# Patient Record
Sex: FEMALE | Race: BLACK OR AFRICAN AMERICAN | NOT HISPANIC OR LATINO | Employment: FULL TIME | URBAN - METROPOLITAN AREA
[De-identification: names, ages, dates, MRNs, and addresses within clinical notes are randomized per-mention and may not be internally consistent; named-entity substitution may affect disease eponyms.]

---

## 2018-01-02 ENCOUNTER — OFFICE VISIT (OUTPATIENT)
Dept: URGENT CARE | Facility: CLINIC | Age: 48
End: 2018-01-02
Payer: COMMERCIAL

## 2018-01-02 VITALS
SYSTOLIC BLOOD PRESSURE: 127 MMHG | HEART RATE: 112 BPM | TEMPERATURE: 101 F | HEIGHT: 64 IN | DIASTOLIC BLOOD PRESSURE: 87 MMHG | BODY MASS INDEX: 30.73 KG/M2 | OXYGEN SATURATION: 97 % | WEIGHT: 180 LBS

## 2018-01-02 DIAGNOSIS — R50.9 FEVER, UNSPECIFIED FEVER CAUSE: ICD-10-CM

## 2018-01-02 DIAGNOSIS — R11.0 NAUSEA IN ADULT: ICD-10-CM

## 2018-01-02 DIAGNOSIS — B34.9 VIRAL SYNDROME: Primary | ICD-10-CM

## 2018-01-02 DIAGNOSIS — R10.9 ABDOMINAL PAIN, UNSPECIFIED ABDOMINAL LOCATION: ICD-10-CM

## 2018-01-02 LAB
B-HCG UR QL: NEGATIVE
BILIRUB UR QL STRIP: NEGATIVE
CTP QC/QA: YES
CTP QC/QA: YES
FLUAV AG NPH QL: NEGATIVE
FLUBV AG NPH QL: NEGATIVE
GLUCOSE UR QL STRIP: NEGATIVE
KETONES UR QL STRIP: POSITIVE
LEUKOCYTE ESTERASE UR QL STRIP: NEGATIVE
PH, POC UA: 5.5 (ref 5–8)
POC BLOOD, URINE: NEGATIVE
POC NITRATES, URINE: NEGATIVE
PROT UR QL STRIP: NEGATIVE
SP GR UR STRIP: 1.02 (ref 1–1.03)
UROBILINOGEN UR STRIP-ACNC: NORMAL (ref 0.1–1.1)

## 2018-01-02 PROCEDURE — 99213 OFFICE O/P EST LOW 20 MIN: CPT | Mod: 25,S$GLB,, | Performed by: FAMILY MEDICINE

## 2018-01-02 PROCEDURE — 81025 URINE PREGNANCY TEST: CPT | Mod: S$GLB,,, | Performed by: FAMILY MEDICINE

## 2018-01-02 PROCEDURE — 81003 URINALYSIS AUTO W/O SCOPE: CPT | Mod: QW,S$GLB,, | Performed by: FAMILY MEDICINE

## 2018-01-02 PROCEDURE — 87804 INFLUENZA ASSAY W/OPTIC: CPT | Mod: 59,QW,S$GLB, | Performed by: FAMILY MEDICINE

## 2018-01-02 NOTE — PATIENT INSTRUCTIONS
"  Viral Syndrome (Adult)  A viral illness may cause a number of symptoms. The symptoms depend on the part of the body that the virus affects. If it settles in your nose, throat, and lungs, it may cause cough, sore throat, congestion, and sometimes headache. If it settles in your stomach and intestinal tract, it may cause vomiting and diarrhea. Sometimes it causes vague symptoms like "aching all over," feeling tired, loss of appetite, or fever.  A viral illness usually lasts 1 to 2 weeks, but sometimes it lasts longer. In some cases, a more serious infection can look like a viral syndrome in the first few days of the illness. You may need another exam and additional tests to know the difference. Watch for the warning signs listed below.  Home care  Follow these guidelines for taking care of yourself at home:  · If symptoms are severe, rest at home for the first 2 to 3 days.  · Stay away from cigarette smoke - both your smoke and the smoke from others.  · You may use over-the-counter acetaminophen or ibuprofen for fever, muscle aching, and headache, unless another medicine was prescribed for this. If you have chronic liver or kidney disease or ever had a stomach ulcer or GI bleeding, talk with your doctor before using these medicines. No one who is younger than 18 and ill with a fever should take aspirin. It may cause severe disease or death.  · Your appetite may be poor, so a light diet is fine. Avoid dehydration by drinking 8 to 12 8-ounce glasses of fluids each day. This may include water; orange juice; lemonade; apple, grape, and cranberry juice; clear fruit drinks; electrolyte replacement and sports drinks; and decaffeinated teas and coffee. If you have been diagnosed with a kidney disease, ask your doctor how much and what types of fluids you should drink to prevent dehydration. If you have kidney disease, drinking too much fluid can cause it build up in the your body and be dangerous to your " health.  · Over-the-counter remedies won't shorten the length of the illness but may be helpful for cough, sore throat; and nasal and sinus congestion. Don't use decongestants if you have high blood pressure.  Follow-up care  Follow up with your healthcare provider if you do not improve over the next week.  Call 911  Get emergency medical care if any of the following occur:  · Convulsion  · Feeling weak, dizzy, or like you are going to faint  · Chest pain, shortness of breath, wheezing, or difficulty breathing  When to seek medical advice  Call your healthcare provider right away if any of these occur:  · Cough with lots of colored sputum (mucus) or blood in your sputum  · Chest pain, shortness of breath, wheezing, or difficulty breathing  · Severe headache; face, neck, or ear pain  · Severe, constant pain in the lower right side of your belly (abdominal)  · Continued vomiting (cant keep liquids down)  · Frequent diarrhea (more than 5 times a day); blood (red or black color) or mucus in diarrhea  · Feeling weak, dizzy, or like you are going to faint  · Extreme thirst  · Fever of 100.4°F (38°C) or higher, or as directed by your healthcare provider  Date Last Reviewed: 9/25/2015  © 1124-0993 [employer]. [address] [place] [address], [place] [address]. All rights reserved. This information is not intended as a substitute for professional medical care. Always follow your healthcare professional's instructions.      Try OTC Prilosec (generic Omeprazole) for abdominal discomfort.  Tylenol as needed for elevated temperature.  Off work today and tomorrow.  Recheck if not continuing to improve.

## 2018-01-02 NOTE — PROGRESS NOTES
"Subjective:       Patient ID: Meenakshi Lee is a 47 y.o. female.    Vitals:  height is 5' 4" (1.626 m) and weight is 81.6 kg (180 lb). Her oral temperature is 100.5 °F (38.1 °C) (abnormal). Her blood pressure is 127/87 and her pulse is 112 (abnormal). Her oxygen saturation is 97%.     Chief Complaint: Fever and Headache    48 yo female 3 day history of low grade temp, aches and epigastic discomfort that started last night. Denies dysuria, but now with beginnings of back discomfort which occurred in past with UTI.  Denies URI symptoms.  No regular meds.  No OTC Advil or Ibuprofen. Reports regular and daily BMs except today. No vomiting, but complaints of nausea. S/P endometrial ablation and infrequent menses. She reports symptoms are actually improving at this point, a little better that yesterday.      Fever    This is a new problem. The current episode started in the past 7 days. The problem occurs constantly. The problem has been unchanged. The maximum temperature noted was 99 to 99.9 F. The temperature was taken using an oral thermometer. Associated symptoms include abdominal pain, headaches and nausea. Pertinent negatives include no chest pain, diarrhea, urinary pain or vomiting.   Headache    This is a new problem. The current episode started in the past 7 days. The problem occurs constantly. The problem has been unchanged. Associated symptoms include abdominal pain, a fever and nausea. Pertinent negatives include no back pain or vomiting.     Review of Systems   Constitution: Positive for chills and fever.   HENT: Negative.    Cardiovascular: Negative for chest pain.   Respiratory: Negative for shortness of breath.    Musculoskeletal: Negative for back pain.   Gastrointestinal: Positive for abdominal pain and nausea. Negative for constipation, diarrhea, hematochezia, melena and vomiting.   Genitourinary: Negative for dysuria.   Neurological: Positive for headaches.       Objective:      Physical Exam "   Constitutional: She is oriented to person, place, and time. She appears well-developed and well-nourished.   HENT:   Head: Normocephalic and atraumatic.   Right Ear: External ear normal.   Left Ear: External ear normal.   Mouth/Throat: Oropharynx is clear and moist.   Eyes: EOM are normal. Pupils are equal, round, and reactive to light.   Neck: Normal range of motion.   Cardiovascular: Normal rate and regular rhythm.    Pulmonary/Chest: Effort normal and breath sounds normal. No respiratory distress. She has no wheezes. She has no rales.   Abdominal: Soft. Bowel sounds are normal. She exhibits no distension and no mass. There is no tenderness. There is no rebound and no guarding.   Very minimal epigastric discomfort only.  No abdominal pain elsewhere.No G/R/R.  No CVA pain.  Normal bowel sounds,   Neurological: She is alert and oriented to person, place, and time.   Psychiatric: She has a normal mood and affect. Her behavior is normal. Judgment and thought content normal.       Assessment:       1. Viral syndrome    2. Fever, unspecified fever cause    3. Abdominal pain, unspecified abdominal location    4. Nausea in adult        Plan:         Viral syndrome    Fever, unspecified fever cause  -     POCT Influenza A/B    Abdominal pain, unspecified abdominal location  -     POCT Urinalysis, Dipstick, Automated, W/O Scope    Nausea in adult  -     POCT urine pregnancy    Influenza Negative. UPT Negative.  Urinalysis Neg, except for small ketones.

## 2018-01-02 NOTE — LETTER
January 2, 2018      [facility] [address], [address]  [place] [address]  Phone: [phone]  Fax: [phone]       Patient: Meenakshi Lee   YOB: 1970  Date of Visit: 01/02/2018    To Whom It May Concern:    Denisha Lee  was at [facility] on 01/02/2018. She may return to work/school on Jan4, 2018 with no restrictions. If you have any questions or concerns, or if I can be of further assistance, please do not hesitate to contact me.    Sincerely,          Priyanka Darby MD

## 2018-12-20 ENCOUNTER — OFFICE VISIT (OUTPATIENT)
Dept: URGENT CARE | Facility: CLINIC | Age: 48
End: 2018-12-20
Payer: COMMERCIAL

## 2018-12-20 VITALS
HEART RATE: 83 BPM | TEMPERATURE: 98 F | WEIGHT: 187 LBS | HEIGHT: 64 IN | BODY MASS INDEX: 31.92 KG/M2 | DIASTOLIC BLOOD PRESSURE: 90 MMHG | SYSTOLIC BLOOD PRESSURE: 125 MMHG | OXYGEN SATURATION: 97 % | RESPIRATION RATE: 18 BRPM

## 2018-12-20 DIAGNOSIS — M54.6 ACUTE RIGHT-SIDED THORACIC BACK PAIN: ICD-10-CM

## 2018-12-20 DIAGNOSIS — M54.2 NECK PAIN ON RIGHT SIDE: Primary | ICD-10-CM

## 2018-12-20 PROCEDURE — 96372 THER/PROPH/DIAG INJ SC/IM: CPT | Mod: S$GLB,,, | Performed by: FAMILY MEDICINE

## 2018-12-20 RX ORDER — NAPROXEN 500 MG/1
500 TABLET ORAL 2 TIMES DAILY WITH MEALS
Qty: 20 TABLET | Refills: 0 | Status: SHIPPED | OUTPATIENT
Start: 2018-12-20 | End: 2019-02-02

## 2018-12-20 RX ORDER — CYCLOBENZAPRINE HCL 5 MG
5 TABLET ORAL 3 TIMES DAILY PRN
Qty: 30 TABLET | Refills: 0 | Status: SHIPPED | OUTPATIENT
Start: 2018-12-20 | End: 2018-12-30

## 2018-12-20 RX ORDER — METHYLPREDNISOLONE 4 MG/1
TABLET ORAL
Qty: 21 TABLET | Refills: 0 | Status: SHIPPED | OUTPATIENT
Start: 2018-12-20 | End: 2019-02-02

## 2018-12-20 RX ORDER — KETOROLAC TROMETHAMINE 30 MG/ML
30 INJECTION, SOLUTION INTRAMUSCULAR; INTRAVENOUS
Status: COMPLETED | OUTPATIENT
Start: 2018-12-20 | End: 2018-12-20

## 2018-12-20 RX ORDER — BETAMETHASONE SODIUM PHOSPHATE AND BETAMETHASONE ACETATE 3; 3 MG/ML; MG/ML
6 INJECTION, SUSPENSION INTRA-ARTICULAR; INTRALESIONAL; INTRAMUSCULAR; SOFT TISSUE
Status: COMPLETED | OUTPATIENT
Start: 2018-12-20 | End: 2018-12-20

## 2018-12-20 RX ADMIN — BETAMETHASONE SODIUM PHOSPHATE AND BETAMETHASONE ACETATE 6 MG: 3; 3 INJECTION, SUSPENSION INTRA-ARTICULAR; INTRALESIONAL; INTRAMUSCULAR; SOFT TISSUE at 11:12

## 2018-12-20 RX ADMIN — KETOROLAC TROMETHAMINE 30 MG: 30 INJECTION, SOLUTION INTRAMUSCULAR; INTRAVENOUS at 11:12

## 2018-12-20 NOTE — PATIENT INSTRUCTIONS
DO NOT START STEROID DOSE PACK OR NAPROSYN UNTIL TOMORROW    CAN TAKE FLEXERIL AT NIGHT    BE SURE TO STRETCH; STILLNESS PROMOTES SPASMS     ALTERNATE ICE AND HEAT    Back Care Tips    Caring for your back  These are things you can do to prevent a recurrence of acute back pain and to reduce symptoms from chronic back pain:  · Maintain a healthy weight. If you are overweight, losing weight will help most types of back pain.  · Exercise is an important part of recovery from most types of back pain. The muscles behind and in front of the spine support the back. This means strengthening both the back muscles and the abdominal muscles will provide better support for your spine.   · Swimming and brisk walking are good overall exercises to improve your fitness level.  · Practice safe lifting methods (below).  · Practice good posture when sitting, standing and walking. Avoid prolonged sitting. This puts more stress on the lower back than standing or walking.  · Wear quality shoes with sufficient arch support. Foot and ankle alignment can affect back symptoms. Women should avoid wearing high heels.  · Therapeutic massage can help relax the back muscles without stretching them.  · During the first 24 to 72 hours after an acute injury or flare-up of chronic back pain, apply an ice pack to the painful area for 20 minutes and then remove it for 20 minutes, over a period of 60 to 90 minutes, or several times a day. As a safety precaution, do not use a heating pad at bedtime. Sleeping on a heating pad can lead to skin burns or tissue damage.  · You can alternate ice and heat therapies.  Medications  Talk to your healthcare provider before using medicines, especially if you have other medical problems or are taking other medicines.  · You may use acetaminophen or ibuprofen to control pain, unless your healthcare provider prescribed other pain medicine. If you have chronic conditions like diabetes, liver or kidney disease,  stomach ulcers, or gastrointestinal bleeding, or are taking blood thinners, talk with your healthcare provider before taking any medicines.  · Be careful if you are given prescription pain medicines, narcotics, or medicine for muscle spasm. They can cause drowsiness, affect your coordination, reflexes, and judgment. Do not drive or operate heavy machinery while taking these types of medicines. Take prescription pain medicine only as prescribed by your healthcare provider.  Lumbar stretch  Here is a simple stretching exercise that will help relax muscle spasm and keep your back more limber. If exercise makes your back pain worse, dont do it.  · Lie on your back with your knees bent and both feet on the ground.  · Slowly raise your left knee to your chest as you flatten your lower back against the floor. Hold for 5 seconds.  · Relax and repeat the exercise with your right knee.  · Do 10 of these exercises for each leg.  Safe lifting method  · Dont bend over at the waist to lift an object off the floor.  Instead, bend your knees and hips in a squat.   · Keep your back and head upright  · Hold the object close to your body, directly in front of you.  · Straighten your legs to lift the object.   · Lower the object to the floor in the reverse fashion.  · If you must slide something across the floor, push it.  Posture tips  Sitting  Sit in chairs with straight backs or low-back support. Keep your knees lower than your hips, with your feet flat on the floor.  When driving, sit up straight. Adjust the seat forward so you are not leaning toward the steering wheel.  A small pillow or rolled towel behind your lower back may help if you are driving long distances.   Standing  When standing for long periods, shift most of your weight to one leg at a time. Alternate legs every few minutes.   Sleeping  The best way to sleep is on your side with your knees bent. Put a low pillow under your head to support your neck in a neutral  spine position. Avoid thick pillows that bend your neck to one side. Put a pillow between your legs to further relax your lower back. If you sleep on your back, put pillows under your knees to support your legs in a slightly flexed position. Use a firm mattress. If your mattress sags, replace it, or use a 1/2-inch plywood board under the mattress to add support.  Follow-up care  Follow up with your healthcare provider, or as advised.  If X-rays, a CT scan or an MRI scan were taken, they will be reviewed by a radiologist. You will be notified of any new findings that may affect your care.  Call 911  Seek emergency medical care if any of the following occur:  · Trouble breathing  · Confusion  · Very drowsy  · Fainting or loss of consciousness  · Rapid or very slow heart rate  · Loss of  bowel or bladder control  When to seek medical care  Call your healthcare provider if any of the following occur:  · Pain becomes worse or spreads to your arms or legs  · Weakness or numbness in one or both arms or legs  · Numbness in the groin area  Date Last Reviewed: 6/1/2016  © 7196-8398 [employer]. [address] [place] [address], [place] [address]. All rights reserved. This information is not intended as a substitute for professional medical care. Always follow your healthcare professional's instructions.

## 2018-12-20 NOTE — LETTER
December 20, 2018      [facility] [address], [address]  [place] [address]  Phone: [phone]  Fax: [phone]       Patient: Meenakshi Lee   YOB: 1970  Date of Visit: 12/20/2018    To Whom It May Concern:    Denisha Lee  was at [facility] on 12/20/2018. She may return to work/school on 12/21/2018 with no restrictions. If you have any questions or concerns, or if I can be of further assistance, please do not hesitate to contact me.    Sincerely,    Prosper Weaver, NP

## 2018-12-20 NOTE — PROGRESS NOTES
"Subjective:       Patient ID: Meenakshi Lee is a 48 y.o. female.    Vitals:  height is 5' 4" (1.626 m) and weight is 84.8 kg (187 lb). Her temperature is 97.6 °F (36.4 °C). Her blood pressure is 125/90 (abnormal) and her pulse is 83. Her respiration is 18 and oxygen saturation is 97%.     Chief Complaint: Back Pain and Neck Pain    Pt states Sunday night she bagan to have neck/upper back pain on right side and has been taken flexeril without relief. In 2015, pt was diagnosed with bulging disc in neck. Pt denies any trauma/injury.      Back Pain   This is a new problem. The current episode started in the past 7 days. The problem occurs constantly. The problem has been gradually worsening since onset. The pain is present in the thoracic spine. The quality of the pain is described as aching. The pain does not radiate. The pain is at a severity of 8/10. The pain is moderate. The pain is worse during the night. The symptoms are aggravated by lying down, sitting, stress, twisting and standing. Stiffness is present all day. Pertinent negatives include no abdominal pain, bladder incontinence, bowel incontinence, dysuria or numbness. Treatments tried: flexeril Ibuprofen 800 tylenol. The treatment provided no relief.   Neck Pain    This is a new problem. The current episode started in the past 7 days. The problem occurs constantly. The problem has been gradually worsening. The pain is associated with an unknown factor. The pain is present in the right side. The quality of the pain is described as aching and stabbing. The pain is at a severity of 8/10. The pain is moderate. The symptoms are aggravated by bending, coughing, position and stress. The pain is worse during the night. Stiffness is present all day. Pertinent negatives include no numbness. Treatments tried: flexeril ibprufen tylenol. The treatment provided no relief.       Constitution: Negative for fatigue.   Neck: Positive for neck pain, neck stiffness and bulging " disc disease.   Gastrointestinal: Negative for abdominal pain and bowel incontinence.   Genitourinary: Negative for dysuria, urgency, bladder incontinence and hematuria.   Musculoskeletal: Positive for back pain and muscle ache. Negative for muscle cramps and history of spine disorder.        Neck pain     Skin: Negative for rash.   Neurological: Negative for coordination disturbances, numbness and tingling.       Objective:      Physical Exam   Constitutional: She is oriented to person, place, and time. Vital signs are normal. She appears well-developed and well-nourished. She is active and cooperative. No distress.   HENT:   Head: Normocephalic and atraumatic.   Nose: Nose normal.   Mouth/Throat: Oropharynx is clear and moist and mucous membranes are normal.   Eyes: Conjunctivae and lids are normal.   Neck: Trachea normal, normal range of motion, full passive range of motion without pain and phonation normal. Neck supple.   Cardiovascular: Normal rate, regular rhythm, normal heart sounds, intact distal pulses and normal pulses.   Pulmonary/Chest: Effort normal and breath sounds normal.   Abdominal: Soft. Normal appearance and bowel sounds are normal. She exhibits no abdominal bruit, no pulsatile midline mass and no mass.   Musculoskeletal: She exhibits no edema or deformity.        Cervical back: She exhibits decreased range of motion, tenderness, pain and spasm.        Thoracic back: She exhibits tenderness, pain and spasm.        Back:    Neurological: She is alert and oriented to person, place, and time. She has normal strength and normal reflexes. No sensory deficit.   Skin: Skin is warm, dry and intact. She is not diaphoretic.   Psychiatric: She has a normal mood and affect. Her speech is normal and behavior is normal. Judgment and thought content normal. Cognition and memory are normal.   Nursing note and vitals reviewed.      Assessment:       1. Neck pain on right side    2. Acute right-sided thoracic back  pain        Plan:         Neck pain on right side  -     ketorolac injection 30 mg  -     betamethasone acetate-betamethasone sodium phosphate injection 6 mg  -     methylPREDNISolone (MEDROL DOSEPACK) 4 mg tablet; Take as directed on package  Dispense: 21 tablet; Refill: 0  -     cyclobenzaprine (FLEXERIL) 5 MG tablet; Take 1 tablet (5 mg total) by mouth 3 (three) times daily as needed.  Dispense: 30 tablet; Refill: 0  -     naproxen (NAPROSYN) 500 MG tablet; Take 1 tablet (500 mg total) by mouth 2 (two) times daily with meals.  Dispense: 20 tablet; Refill: 0    Acute right-sided thoracic back pain  -     ketorolac injection 30 mg  -     betamethasone acetate-betamethasone sodium phosphate injection 6 mg  -     methylPREDNISolone (MEDROL DOSEPACK) 4 mg tablet; Take as directed on package  Dispense: 21 tablet; Refill: 0  -     cyclobenzaprine (FLEXERIL) 5 MG tablet; Take 1 tablet (5 mg total) by mouth 3 (three) times daily as needed.  Dispense: 30 tablet; Refill: 0  -     naproxen (NAPROSYN) 500 MG tablet; Take 1 tablet (500 mg total) by mouth 2 (two) times daily with meals.  Dispense: 20 tablet; Refill: 0      Patient Instructions       DO NOT START STEROID DOSE PACK OR NAPROSYN UNTIL TOMORROW    CAN TAKE FLEXERIL AT NIGHT    BE SURE TO STRETCH; STILLNESS PROMOTES SPASMS     ALTERNATE ICE AND HEAT    Back Care Tips    Caring for your back  These are things you can do to prevent a recurrence of acute back pain and to reduce symptoms from chronic back pain:  · Maintain a healthy weight. If you are overweight, losing weight will help most types of back pain.  · Exercise is an important part of recovery from most types of back pain. The muscles behind and in front of the spine support the back. This means strengthening both the back muscles and the abdominal muscles will provide better support for your spine.   · Swimming and brisk walking are good overall exercises to improve your fitness level.  · Practice safe  lifting methods (below).  · Practice good posture when sitting, standing and walking. Avoid prolonged sitting. This puts more stress on the lower back than standing or walking.  · Wear quality shoes with sufficient arch support. Foot and ankle alignment can affect back symptoms. Women should avoid wearing high heels.  · Therapeutic massage can help relax the back muscles without stretching them.  · During the first 24 to 72 hours after an acute injury or flare-up of chronic back pain, apply an ice pack to the painful area for 20 minutes and then remove it for 20 minutes, over a period of 60 to 90 minutes, or several times a day. As a safety precaution, do not use a heating pad at bedtime. Sleeping on a heating pad can lead to skin burns or tissue damage.  · You can alternate ice and heat therapies.  Medications  Talk to your healthcare provider before using medicines, especially if you have other medical problems or are taking other medicines.  · You may use acetaminophen or ibuprofen to control pain, unless your healthcare provider prescribed other pain medicine. If you have chronic conditions like diabetes, liver or kidney disease, stomach ulcers, or gastrointestinal bleeding, or are taking blood thinners, talk with your healthcare provider before taking any medicines.  · Be careful if you are given prescription pain medicines, narcotics, or medicine for muscle spasm. They can cause drowsiness, affect your coordination, reflexes, and judgment. Do not drive or operate heavy machinery while taking these types of medicines. Take prescription pain medicine only as prescribed by your healthcare provider.  Lumbar stretch  Here is a simple stretching exercise that will help relax muscle spasm and keep your back more limber. If exercise makes your back pain worse, dont do it.  · Lie on your back with your knees bent and both feet on the ground.  · Slowly raise your left knee to your chest as you flatten your lower back  against the floor. Hold for 5 seconds.  · Relax and repeat the exercise with your right knee.  · Do 10 of these exercises for each leg.  Safe lifting method  · Dont bend over at the waist to lift an object off the floor.  Instead, bend your knees and hips in a squat.   · Keep your back and head upright  · Hold the object close to your body, directly in front of you.  · Straighten your legs to lift the object.   · Lower the object to the floor in the reverse fashion.  · If you must slide something across the floor, push it.  Posture tips  Sitting  Sit in chairs with straight backs or low-back support. Keep your knees lower than your hips, with your feet flat on the floor.  When driving, sit up straight. Adjust the seat forward so you are not leaning toward the steering wheel.  A small pillow or rolled towel behind your lower back may help if you are driving long distances.   Standing  When standing for long periods, shift most of your weight to one leg at a time. Alternate legs every few minutes.   Sleeping  The best way to sleep is on your side with your knees bent. Put a low pillow under your head to support your neck in a neutral spine position. Avoid thick pillows that bend your neck to one side. Put a pillow between your legs to further relax your lower back. If you sleep on your back, put pillows under your knees to support your legs in a slightly flexed position. Use a firm mattress. If your mattress sags, replace it, or use a 1/2-inch plywood board under the mattress to add support.  Follow-up care  Follow up with your healthcare provider, or as advised.  If X-rays, a CT scan or an MRI scan were taken, they will be reviewed by a radiologist. You will be notified of any new findings that may affect your care.  Call 911  Seek emergency medical care if any of the following occur:  · Trouble breathing  · Confusion  · Very drowsy  · Fainting or loss of consciousness  · Rapid or very slow heart rate  · Loss of   bowel or bladder control  When to seek medical care  Call your healthcare provider if any of the following occur:  · Pain becomes worse or spreads to your arms or legs  · Weakness or numbness in one or both arms or legs  · Numbness in the groin area  Date Last Reviewed: 6/1/2016  © 9458-5784 [employer]. [address] [place] [address], [place] [address]. All rights reserved. This information is not intended as a substitute for professional medical care. Always follow your healthcare professional's instructions.

## 2019-01-30 ENCOUNTER — OFFICE VISIT (OUTPATIENT)
Dept: URGENT CARE | Facility: CLINIC | Age: 49
End: 2019-01-30
Payer: COMMERCIAL

## 2019-01-30 VITALS
HEART RATE: 103 BPM | TEMPERATURE: 99 F | WEIGHT: 187 LBS | SYSTOLIC BLOOD PRESSURE: 128 MMHG | BODY MASS INDEX: 32.1 KG/M2 | DIASTOLIC BLOOD PRESSURE: 87 MMHG | OXYGEN SATURATION: 97 %

## 2019-01-30 DIAGNOSIS — H10.9 CONJUNCTIVITIS OF LEFT EYE, UNSPECIFIED CONJUNCTIVITIS TYPE: ICD-10-CM

## 2019-01-30 DIAGNOSIS — J11.1 INFLUENZA: Primary | ICD-10-CM

## 2019-01-30 PROCEDURE — 99214 OFFICE O/P EST MOD 30 MIN: CPT | Mod: S$GLB,,, | Performed by: NURSE PRACTITIONER

## 2019-01-30 PROCEDURE — 3008F BODY MASS INDEX DOCD: CPT | Mod: CPTII,S$GLB,, | Performed by: NURSE PRACTITIONER

## 2019-01-30 PROCEDURE — 3008F PR BODY MASS INDEX (BMI) DOCUMENTED: ICD-10-PCS | Mod: CPTII,S$GLB,, | Performed by: NURSE PRACTITIONER

## 2019-01-30 PROCEDURE — 99214 PR OFFICE/OUTPT VISIT, EST, LEVL IV, 30-39 MIN: ICD-10-PCS | Mod: S$GLB,,, | Performed by: NURSE PRACTITIONER

## 2019-01-30 RX ORDER — ONDANSETRON 4 MG/1
4 TABLET, ORALLY DISINTEGRATING ORAL EVERY 6 HOURS PRN
Qty: 12 TABLET | Refills: 0 | Status: SHIPPED | OUTPATIENT
Start: 2019-01-30 | End: 2019-02-02

## 2019-01-30 RX ORDER — OSELTAMIVIR PHOSPHATE 75 MG/1
75 CAPSULE ORAL 2 TIMES DAILY
Qty: 10 CAPSULE | Refills: 0 | Status: SHIPPED | OUTPATIENT
Start: 2019-01-30 | End: 2019-02-04

## 2019-01-30 RX ORDER — POLYMYXIN B SULFATE AND TRIMETHOPRIM 1; 10000 MG/ML; [USP'U]/ML
1 SOLUTION OPHTHALMIC EVERY 6 HOURS
Qty: 1 BOTTLE | Refills: 0 | Status: SHIPPED | OUTPATIENT
Start: 2019-01-30

## 2019-01-30 NOTE — PROGRESS NOTES
Subjective:       Patient ID: Meenakshi Lee is a 48 y.o. female.    Vitals:  weight is 84.8 kg (187 lb). Her temperature is 99.3 °F (37.4 °C). Her blood pressure is 128/87 and her pulse is 103. Her oxygen saturation is 97%.     Chief Complaint: URI    Pt states that she has some congestion and left eye discharge and crusted over . Pt states that she has been having a headache for the past 3 days .      URI    This is a new problem. The current episode started in the past 7 days. The problem has been gradually worsening. Associated symptoms include congestion, coughing, ear pain, headaches, sneezing and a sore throat. Pertinent negatives include no chest pain, diarrhea, dysuria, nausea, rash or vomiting. She has tried antihistamine for the symptoms. The treatment provided no relief.       Constitution: Negative for chills, fatigue and fever.   HENT: Positive for ear pain, congestion and sore throat.    Neck: Negative for painful lymph nodes.   Cardiovascular: Negative for chest pain and leg swelling.   Eyes: Positive for eye discharge. Negative for double vision and blurred vision.   Respiratory: Positive for cough. Negative for shortness of breath.    Gastrointestinal: Negative for nausea, vomiting and diarrhea.   Genitourinary: Negative for dysuria, frequency, urgency and history of kidney stones.   Musculoskeletal: Negative for joint pain, joint swelling, muscle cramps and muscle ache.   Skin: Negative for color change, pale, rash and bruising.   Allergic/Immunologic: Positive for sneezing. Negative for seasonal allergies.   Neurological: Positive for headaches. Negative for dizziness, history of vertigo, light-headedness and passing out.   Hematologic/Lymphatic: Negative for swollen lymph nodes.   Psychiatric/Behavioral: Negative for nervous/anxious, sleep disturbance and depression. The patient is not nervous/anxious.        Objective:      Physical Exam   Constitutional: She is oriented to person, place, and  time. She appears well-developed and well-nourished. She is cooperative.  Non-toxic appearance. She appears ill. No distress.   HENT:   Head: Normocephalic and atraumatic.   Right Ear: Hearing, tympanic membrane, external ear and ear canal normal.   Left Ear: Hearing, tympanic membrane, external ear and ear canal normal.   Nose: Nose normal. No mucosal edema, rhinorrhea or nasal deformity. No epistaxis. Right sinus exhibits no maxillary sinus tenderness and no frontal sinus tenderness. Left sinus exhibits no maxillary sinus tenderness and no frontal sinus tenderness.   Mouth/Throat: Uvula is midline and mucous membranes are normal. No trismus in the jaw. Normal dentition. No uvula swelling. Posterior oropharyngeal erythema present.   Post nasal drip    Eyes: EOM and lids are normal. Pupils are equal, round, and reactive to light. Left eye exhibits discharge. Left conjunctiva is injected. No scleral icterus.   Sclera clear bilat   Neck: Trachea normal, full passive range of motion without pain and phonation normal. Neck supple.   Cardiovascular: Normal rate, regular rhythm, normal heart sounds, intact distal pulses and normal pulses.   Pulmonary/Chest: Effort normal and breath sounds normal. No respiratory distress.   Abdominal: Soft. Normal appearance and bowel sounds are normal. She exhibits no distension. There is no tenderness.   Musculoskeletal: Normal range of motion. She exhibits no edema or deformity.   Neurological: She is alert and oriented to person, place, and time. She exhibits normal muscle tone. Coordination normal.   Skin: Skin is warm, dry and intact. She is not diaphoretic. No pallor.   Psychiatric: She has a normal mood and affect. Her speech is normal and behavior is normal. Judgment and thought content normal. Cognition and memory are normal.   Nursing note and vitals reviewed.      Assessment:       1. Influenza    2. Conjunctivitis of left eye, unspecified conjunctivitis type        Plan:        Patient being treated symptomatically for influenza.     Influenza  -     oseltamivir (TAMIFLU) 75 MG capsule; Take 1 capsule (75 mg total) by mouth 2 (two) times daily. for 5 days  Dispense: 10 capsule; Refill: 0  -     ondansetron (ZOFRAN-ODT) 4 MG TbDL; Take 1 tablet (4 mg total) by mouth every 6 (six) hours as needed.  Dispense: 12 tablet; Refill: 0    Conjunctivitis of left eye, unspecified conjunctivitis type  -     polymyxin B sulf-trimethoprim (POLYTRIM) 10,000 unit- 1 mg/mL Drop; Place 1 drop into the left eye every 6 (six) hours.  Dispense: 1 Bottle; Refill: 0      Patient Instructions   You have been diagnosed with Influenza.   You are contagious for 24 hours after you start the Tamilfu or 24 hours after your last fever, whichever happens last.  Please drink plenty of fluids.  Please get plenty of rest.  Please return here or go to the Emergency Department for any concerns or worsening of condition.  Tamiflu prescription has been discussed and if prescribed, please take to completion unless you cannot tolerate the side effects.     Take tylenol (acetominophen) for fever, chills or body aches every 4 hours. do not exceed 4000 mg/ day.  Take Motrin (Ibuprofen) every 4 hours for fever, chills, pain or inflammation.  Use an antihistmine such as claritin or zyrtec to dry you out. Use pseudoephedrine (behind the counter) to decongest (beware this can raise your blood pressure). Use mucinex (guaifenisin) to break up mucous.    Patient to follow up with primary if symptoms persist or worsen.     Influenza (Adult)    Influenza is also called the flu. It is a viral illness that affects the air passages of your lungs. It is different from the common cold. The flu can easily be passed from one to person to another. It may be spread through the air by coughing and sneezing. Or it can be spread by touching the sick person and then touching your own eyes, nose, or mouth.  The flu starts 1 to 3 days after you are  exposed to the flu virus. It may last for 1 to 2 weeks but many people feel tired or fatigued for many weeks afterward. You usually dont need to take antibiotics unless you have a complication. This might be an ear or sinus infection or pneumonia.  Symptoms of the flu may be mild or severe. They can include extreme tiredness (wanting to stay in bed all day), chills, fevers, muscle aches, soreness with eye movement, headache, and a dry, hacking cough.  Home care  Follow these guidelines when caring for yourself at home:  · Avoid being around cigarette smoke, whether yours or other peoples.  · Acetaminophen or ibuprofen will help ease your fever, muscle aches, and headache. Dont give aspirin to anyone younger than 18 who has the flu. Aspirin can harm the liver.  · Nausea and loss of appetite are common with the flu. Eat light meals. Drink 6 to 8 glasses of liquids every day. Good choices are water, sport drinks, soft drinks without caffeine, juices, tea, and soup. Extra fluids will also help loosen secretions in your nose and lungs.  · Over-the-counter cold medicines will not make the flu go away faster. But the medicines may help with coughing, sore throat, and congestion in your nose and sinuses. Dont use a decongestant if you have high blood pressure.  · Stay home until your fever has been gone for at least 24 hours without using medicine to reduce fever.  Follow-up care  Follow up with your healthcare provider, or as advised, if you are not getting better over the next week.  If you are age 65 or older, talk with your provider about getting a pneumococcal vaccine every 5 years. You should also get this vaccine if you have chronic asthma or COPD. All adults should get a flu vaccine every fall. Ask your provider about this.  When to seek medical advice  Call your healthcare provider right away if any of these occur:  · Cough with lots of colored mucus (sputum) or blood in your mucus  · Chest pain, shortness of  breath, wheezing, or trouble breathing  · Severe headache, or face, neck, or ear pain  · New rash with fever  · Fever of 100.4°F (38°C) or higher, or as directed by your healthcare provider  · Confusion, behavior change, or seizure  · Severe weakness or dizziness  · You get a new fever or cough after getting better for a few days  Date Last Reviewed: 1/1/2017  © 8523-8687 [employer]. [address] [place] [address], [place] [address]. All rights reserved. This information is not intended as a substitute for professional medical care. Always follow your healthcare professional's instructions.        Conjunctivitis, Bacterial    You have an infection in the membranes covering the white part of the eye. This part of the eye is called the conjunctiva. The infection is called conjunctivitis. The most common symptoms of conjunctivitis include a thick, pus-like discharge from the eye, swollen eyelids, redness, eyelids sticking together upon awakening, and a gritty or scratchy feeling in the eye. Your infection was caused by bacteria. It may be treated with medicine. With treatment, the infection takes about 7 to 10 days to resolve.  Home care  · Use prescribed antibiotic eye drops or ointment as directed to treat the infection.  · Apply a warm compress (towel soaked in warm water) to the affected eye 3 to 4 times a day. Do this just before applying medicine to the eye.  · Use a warm, wet cloth to wipe away crusting of the eyelids in the morning. This is caused by mucus drainage during the night. You may also use saline irrigating solution or artificial tears to rinse away mucus in the eye. Do not put a patch over the eye.  · Wash your hands before and after touching the infected eye. This is to prevent spreading the infection to the other eye, and to other people. Do not share your towels or washcloths with others.  · You may use acetaminophen or ibuprofen to control pain, unless another medicine was prescribed.  (Note: If you have chronic liver or kidney disease or have ever had a stomach ulcer or gastrointestinal bleeding, talk with your doctor before using these medicines.)  · Do not wear contact lenses until your eyes have healed and all symptoms are gone.  Follow-up care  Follow up with your healthcare provider, or as advised.  When to seek medical advice  Call your healthcare provider right away if any of these occur:  · Worsening vision  · Increasing pain in the eye  · Increasing swelling or redness of the eyelid  · Redness spreading around the eye  Date Last Reviewed: 6/14/2015  © 7631-3927 [employer]. [address] [place] [address]. All rights reserved. This information is not intended as a substitute for professional medical care. Always follow your healthcare professional's instructions.

## 2019-01-30 NOTE — LETTER
January 30, 2019      [facility] [address], [address]  [place] [address]  Phone: [phone]  Fax: [phone]       Patient: Meenakshi Lee   YOB: 1970  Date of Visit: 01/30/2019    To Whom It May Concern:    Denisha Lee  was at [facility] on 01/30/2019. She may return to work/school on 1/2/19 with no restrictions. If you have any questions or concerns, or if I can be of further assistance, please do not hesitate to contact me.    Sincerely,    Meenakshi Gaytan NP

## 2019-01-30 NOTE — LETTER
January 30, 2019      [facility] [address], [address]  [place] [address]  Phone: [phone]  Fax: [phone]       Patient: Meenakshi Lee   YOB: 1970  Date of Visit: 01/30/2019    To Whom It May Concern:    Denisha Lee  was at [facility] on 01/30/2019. She may return to work/school on 2/2/19 with no restrictions. If you have any questions or concerns, or if I can be of further assistance, please do not hesitate to contact me.    Sincerely,    Meenakshi Gaytna NP

## 2019-01-30 NOTE — PATIENT INSTRUCTIONS
You have been diagnosed with Influenza.   You are contagious for 24 hours after you start the Tamilfu or 24 hours after your last fever, whichever happens last.  Please drink plenty of fluids.  Please get plenty of rest.  Please return here or go to the Emergency Department for any concerns or worsening of condition.  Tamiflu prescription has been discussed and if prescribed, please take to completion unless you cannot tolerate the side effects.     Take tylenol (acetominophen) for fever, chills or body aches every 4 hours. do not exceed 4000 mg/ day.  Take Motrin (Ibuprofen) every 4 hours for fever, chills, pain or inflammation.  Use an antihistmine such as claritin or zyrtec to dry you out. Use pseudoephedrine (behind the counter) to decongest (beware this can raise your blood pressure). Use mucinex (guaifenisin) to break up mucous.    Patient to follow up with primary if symptoms persist or worsen.     Influenza (Adult)    Influenza is also called the flu. It is a viral illness that affects the air passages of your lungs. It is different from the common cold. The flu can easily be passed from one to person to another. It may be spread through the air by coughing and sneezing. Or it can be spread by touching the sick person and then touching your own eyes, nose, or mouth.  The flu starts 1 to 3 days after you are exposed to the flu virus. It may last for 1 to 2 weeks but many people feel tired or fatigued for many weeks afterward. You usually dont need to take antibiotics unless you have a complication. This might be an ear or sinus infection or pneumonia.  Symptoms of the flu may be mild or severe. They can include extreme tiredness (wanting to stay in bed all day), chills, fevers, muscle aches, soreness with eye movement, headache, and a dry, hacking cough.  Home care  Follow these guidelines when caring for yourself at home:  · Avoid being around cigarette smoke, whether yours or other  peoples.  · Acetaminophen or ibuprofen will help ease your fever, muscle aches, and headache. Dont give aspirin to anyone younger than 18 who has the flu. Aspirin can harm the liver.  · Nausea and loss of appetite are common with the flu. Eat light meals. Drink 6 to 8 glasses of liquids every day. Good choices are water, sport drinks, soft drinks without caffeine, juices, tea, and soup. Extra fluids will also help loosen secretions in your nose and lungs.  · Over-the-counter cold medicines will not make the flu go away faster. But the medicines may help with coughing, sore throat, and congestion in your nose and sinuses. Dont use a decongestant if you have high blood pressure.  · Stay home until your fever has been gone for at least 24 hours without using medicine to reduce fever.  Follow-up care  Follow up with your healthcare provider, or as advised, if you are not getting better over the next week.  If you are age 65 or older, talk with your provider about getting a pneumococcal vaccine every 5 years. You should also get this vaccine if you have chronic asthma or COPD. All adults should get a flu vaccine every fall. Ask your provider about this.  When to seek medical advice  Call your healthcare provider right away if any of these occur:  · Cough with lots of colored mucus (sputum) or blood in your mucus  · Chest pain, shortness of breath, wheezing, or trouble breathing  · Severe headache, or face, neck, or ear pain  · New rash with fever  · Fever of 100.4°F (38°C) or higher, or as directed by your healthcare provider  · Confusion, behavior change, or seizure  · Severe weakness or dizziness  · You get a new fever or cough after getting better for a few days  Date Last Reviewed: 1/1/2017  © 0027-4666 [employer]. [address] [place] [address], [place] [address]. All rights reserved. This information is not intended as a substitute for professional medical care. Always follow your healthcare  professional's instructions.        Conjunctivitis, Bacterial    You have an infection in the membranes covering the white part of the eye. This part of the eye is called the conjunctiva. The infection is called conjunctivitis. The most common symptoms of conjunctivitis include a thick, pus-like discharge from the eye, swollen eyelids, redness, eyelids sticking together upon awakening, and a gritty or scratchy feeling in the eye. Your infection was caused by bacteria. It may be treated with medicine. With treatment, the infection takes about 7 to 10 days to resolve.  Home care  · Use prescribed antibiotic eye drops or ointment as directed to treat the infection.  · Apply a warm compress (towel soaked in warm water) to the affected eye 3 to 4 times a day. Do this just before applying medicine to the eye.  · Use a warm, wet cloth to wipe away crusting of the eyelids in the morning. This is caused by mucus drainage during the night. You may also use saline irrigating solution or artificial tears to rinse away mucus in the eye. Do not put a patch over the eye.  · Wash your hands before and after touching the infected eye. This is to prevent spreading the infection to the other eye, and to other people. Do not share your towels or washcloths with others.  · You may use acetaminophen or ibuprofen to control pain, unless another medicine was prescribed. (Note: If you have chronic liver or kidney disease or have ever had a stomach ulcer or gastrointestinal bleeding, talk with your doctor before using these medicines.)  · Do not wear contact lenses until your eyes have healed and all symptoms are gone.  Follow-up care  Follow up with your healthcare provider, or as advised.  When to seek medical advice  Call your healthcare provider right away if any of these occur:  · Worsening vision  · Increasing pain in the eye  · Increasing swelling or redness of the eyelid  · Redness spreading around the eye  Date Last Reviewed:  6/14/2015  © 7287-7077 [employer]. [address] [place] [address], [place] [address]. All rights reserved. This information is not intended as a substitute for professional medical care. Always follow your healthcare professional's instructions.

## 2019-02-02 ENCOUNTER — OFFICE VISIT (OUTPATIENT)
Dept: URGENT CARE | Facility: CLINIC | Age: 49
End: 2019-02-02
Payer: COMMERCIAL

## 2019-02-02 VITALS
OXYGEN SATURATION: 98 % | BODY MASS INDEX: 30.73 KG/M2 | HEART RATE: 103 BPM | TEMPERATURE: 97 F | DIASTOLIC BLOOD PRESSURE: 83 MMHG | SYSTOLIC BLOOD PRESSURE: 125 MMHG | WEIGHT: 180 LBS | HEIGHT: 64 IN

## 2019-02-02 DIAGNOSIS — J03.90 EXUDATIVE TONSILLITIS: Primary | ICD-10-CM

## 2019-02-02 LAB
CTP QC/QA: YES
S PYO RRNA THROAT QL PROBE: NEGATIVE

## 2019-02-02 PROCEDURE — 99214 PR OFFICE/OUTPT VISIT, EST, LEVL IV, 30-39 MIN: ICD-10-PCS | Mod: S$GLB,,, | Performed by: NURSE PRACTITIONER

## 2019-02-02 PROCEDURE — 87880 STREP A ASSAY W/OPTIC: CPT | Mod: QW,S$GLB,, | Performed by: NURSE PRACTITIONER

## 2019-02-02 PROCEDURE — 99214 OFFICE O/P EST MOD 30 MIN: CPT | Mod: S$GLB,,, | Performed by: NURSE PRACTITIONER

## 2019-02-02 PROCEDURE — 87880 POCT RAPID STREP A: ICD-10-PCS | Mod: QW,S$GLB,, | Performed by: NURSE PRACTITIONER

## 2019-02-02 PROCEDURE — 3008F BODY MASS INDEX DOCD: CPT | Mod: CPTII,S$GLB,, | Performed by: NURSE PRACTITIONER

## 2019-02-02 PROCEDURE — 3008F PR BODY MASS INDEX (BMI) DOCUMENTED: ICD-10-PCS | Mod: CPTII,S$GLB,, | Performed by: NURSE PRACTITIONER

## 2019-02-02 RX ORDER — AMOXICILLIN 875 MG/1
875 TABLET, FILM COATED ORAL 2 TIMES DAILY
Qty: 20 TABLET | Refills: 0 | Status: SHIPPED | OUTPATIENT
Start: 2019-02-02 | End: 2019-02-12

## 2019-02-02 NOTE — PATIENT INSTRUCTIONS
WE WILL CALL YOU WITH RESULTS OF CULTURE IN 3-5 DAYS      Pharyngitis (Sore Throat), Report Pending    Pharyngitis (sore throat) is often due to a virus. It can also be caused by the streptococcus, or strep, bacterium, often called strep throat. Both viral and strep infections can cause throat pain that is worse when swallowing, aching all over with headache, and fever. Both types of infections are contagious. They may be spread by coughing, kissing, or touching others after touching your mouth or nose.  A test has been done to find out whether you (or your child, if your child is the patient) have strep throat. Call this facility or your healthcare provider if you were not given your test results. If the test is positive for strep infection, you will need to take antibiotic medicines. A prescription can be called into your pharmacy at that time. If the test is negative, you probably have a viral pharyngitis. This does not need to be treated with antibiotics. Until you receive the results of the strep test, you should stay home from work. If your child is being tested, he or she should stay home from school.  Home care  · Rest at home. Drink plenty of fluids so you won't get dehydrated.  · If the test is positive for strep, don't go to work or school for the first 2 days of taking the antibiotics. After this time, you will not be contagious. You can then return to work or school if you are feeling better.   · Take the antibiotic medicine for the full 10 days, even if you feel better. This is very important to make sure the infection is treated. It is also important to prevent drug-resistant germs from developing. If you were given an antibiotic shot, you won't need more antibiotics.  · For children: Use acetaminophen for fever, fussiness, or discomfort. In infants older than 6 months of age, you may use ibuprofen instead of acetaminophen. Talk with your child's healthcare provider before giving these medicines  if your child has chronic liver or kidney disease or ever had a stomach ulcer or GI bleeding. Never give aspirin to a child under 18 years of age who is ill with a fever. It may cause severe liver damage.  · For adults: Use acetaminophen or ibuprofen to control pain or fever, unless another medicine was prescribed for this. Talk with your healthcare provider before taking these medicines if you have chronic liver or kidney disease or ever had a stomach ulcer or GI bleeding.  · Use throat lozenges or numbing throat sprays to help reduce pain. Gargling with warm salt water will also help reduce throat pain. For this, dissolve 1/2 teaspoon of salt in 1 glass of warm water. To help soothe a sore throat, children can sip on juice or a popsicle. Children 5 years and older can also suck on a lollipop or hard candy.  · Don't eat salty or spicy foods. These can irritate the throat.  Follow-up care  Follow up with your healthcare provider or our staff if you don't get better over the next week.  When to seek medical advice  Call your healthcare provider right away if any of these occur:  · Fever as directed by your healthcare provider. For children, seek care if:  ¨ Your child is of any age and has repeated fevers above 104°F (40°C).  ¨ Your child is younger than 2 years of age and has a fever of 100.4°F (38°C) that continues for more than 1 day.  ¨ Your child is 2 years old or older and has a fever of 100.4°F (38°C) that continues for more than 3 days.  · New or worsening ear pain, sinus pain, or headache  · Painful lumps in the back of neck  · Stiff neck  · Lymph nodes are getting larger  · Inability to swallow liquids, excessive drooling, or inability to open mouth wide due to throat pain  · Signs of dehydration (very dark urine or no urine, sunken eyes, dizziness)  · Trouble breathing or noisy breathing  · Muffled voice  · New rash  · Child appears to be getting sicker  Date Last Reviewed: 4/13/2015  © 6920-6120 [employer]. [address] [place] [address], [place] [address]. All rights reserved. This information is not intended as a substitute for professional medical care. Always follow your healthcare professional's instructions.

## 2019-02-02 NOTE — PROGRESS NOTES
"Subjective:       Patient ID: Meenakshi Lee is a 48 y.o. female.    Vitals:  height is 5' 4" (1.626 m) and weight is 81.6 kg (180 lb). Her temperature is 97.3 °F (36.3 °C). Her blood pressure is 125/83 and her pulse is 103. Her oxygen saturation is 98%.     Chief Complaint: Sore Throat    Sore Throat    This is a new problem. The current episode started yesterday. The problem has been unchanged. Maximum temperature: unmeasured. The pain is at a severity of 5/10. Associated symptoms include ear pain, headaches and neck pain. Treatments tried: dayquil. The treatment provided mild relief.       HENT: Positive for ear pain and sore throat.    Neck: Positive for neck pain.   Neurological: Positive for headaches.       Objective:      Physical Exam   Constitutional: She is oriented to person, place, and time. She appears well-developed and well-nourished. She is cooperative.  Non-toxic appearance. She does not appear ill. No distress.   HENT:   Head: Normocephalic and atraumatic.   Right Ear: Hearing, tympanic membrane, external ear and ear canal normal.   Left Ear: Hearing, tympanic membrane, external ear and ear canal normal.   Nose: Nose normal. No mucosal edema, rhinorrhea or nasal deformity. No epistaxis. Right sinus exhibits no maxillary sinus tenderness and no frontal sinus tenderness. Left sinus exhibits no maxillary sinus tenderness and no frontal sinus tenderness.   Mouth/Throat: Uvula is midline and mucous membranes are normal. No trismus in the jaw. Normal dentition. No uvula swelling. Oropharyngeal exudate, posterior oropharyngeal edema and posterior oropharyngeal erythema present. No tonsillar abscesses. Tonsils are 2+ on the right. Tonsils are 2+ on the left. Tonsillar exudate.   Eyes: Conjunctivae and lids are normal. No scleral icterus.   Sclera clear bilat   Neck: Trachea normal, full passive range of motion without pain and phonation normal. Neck supple.   Cardiovascular: Normal rate, regular rhythm, " normal heart sounds, intact distal pulses and normal pulses.   Pulmonary/Chest: Effort normal and breath sounds normal. No respiratory distress.   Abdominal: Soft. Normal appearance and bowel sounds are normal. She exhibits no distension. There is no tenderness.   Musculoskeletal: Normal range of motion. She exhibits no edema or deformity.   Lymphadenopathy:     She has cervical adenopathy.        Right cervical: Superficial cervical adenopathy present.        Left cervical: Superficial cervical adenopathy present.   Neurological: She is alert and oriented to person, place, and time. She exhibits normal muscle tone. Coordination normal.   Skin: Skin is warm, dry and intact. She is not diaphoretic. No pallor.   Psychiatric: She has a normal mood and affect. Her speech is normal and behavior is normal. Judgment and thought content normal. Cognition and memory are normal.   Nursing note and vitals reviewed.      Assessment:       1. Exudative tonsillitis        Plan:         Exudative tonsillitis  -     POCT rapid strep A  -     amoxicillin (AMOXIL) 875 MG tablet; Take 1 tablet (875 mg total) by mouth 2 (two) times daily. for 10 days  Dispense: 20 tablet; Refill: 0  -     Strep A culture, throat      Patient Instructions       WE WILL CALL YOU WITH RESULTS OF CULTURE IN 3-5 DAYS      Pharyngitis (Sore Throat), Report Pending    Pharyngitis (sore throat) is often due to a virus. It can also be caused by the streptococcus, or strep, bacterium, often called strep throat. Both viral and strep infections can cause throat pain that is worse when swallowing, aching all over with headache, and fever. Both types of infections are contagious. They may be spread by coughing, kissing, or touching others after touching your mouth or nose.  A test has been done to find out whether you (or your child, if your child is the patient) have strep throat. Call this facility or your healthcare provider if you were not given your test  results. If the test is positive for strep infection, you will need to take antibiotic medicines. A prescription can be called into your pharmacy at that time. If the test is negative, you probably have a viral pharyngitis. This does not need to be treated with antibiotics. Until you receive the results of the strep test, you should stay home from work. If your child is being tested, he or she should stay home from school.  Home care  · Rest at home. Drink plenty of fluids so you won't get dehydrated.  · If the test is positive for strep, don't go to work or school for the first 2 days of taking the antibiotics. After this time, you will not be contagious. You can then return to work or school if you are feeling better.   · Take the antibiotic medicine for the full 10 days, even if you feel better. This is very important to make sure the infection is treated. It is also important to prevent drug-resistant germs from developing. If you were given an antibiotic shot, you won't need more antibiotics.  · For children: Use acetaminophen for fever, fussiness, or discomfort. In infants older than 6 months of age, you may use ibuprofen instead of acetaminophen. Talk with your child's healthcare provider before giving these medicines if your child has chronic liver or kidney disease or ever had a stomach ulcer or GI bleeding. Never give aspirin to a child under 18 years of age who is ill with a fever. It may cause severe liver damage.  · For adults: Use acetaminophen or ibuprofen to control pain or fever, unless another medicine was prescribed for this. Talk with your healthcare provider before taking these medicines if you have chronic liver or kidney disease or ever had a stomach ulcer or GI bleeding.  · Use throat lozenges or numbing throat sprays to help reduce pain. Gargling with warm salt water will also help reduce throat pain. For this, dissolve 1/2 teaspoon of salt in 1 glass of warm water. To help soothe a sore  throat, children can sip on juice or a popsicle. Children 5 years and older can also suck on a lollipop or hard candy.  · Don't eat salty or spicy foods. These can irritate the throat.  Follow-up care  Follow up with your healthcare provider or our staff if you don't get better over the next week.  When to seek medical advice  Call your healthcare provider right away if any of these occur:  · Fever as directed by your healthcare provider. For children, seek care if:  ¨ Your child is of any age and has repeated fevers above 104°F (40°C).  ¨ Your child is younger than 2 years of age and has a fever of 100.4°F (38°C) that continues for more than 1 day.  ¨ Your child is 2 years old or older and has a fever of 100.4°F (38°C) that continues for more than 3 days.  · New or worsening ear pain, sinus pain, or headache  · Painful lumps in the back of neck  · Stiff neck  · Lymph nodes are getting larger  · Inability to swallow liquids, excessive drooling, or inability to open mouth wide due to throat pain  · Signs of dehydration (very dark urine or no urine, sunken eyes, dizziness)  · Trouble breathing or noisy breathing  · Muffled voice  · New rash  · Child appears to be getting sicker  Date Last Reviewed: 4/13/2015  © 9822-4371 [employer]. [address] [place] [address], [place] [address]. All rights reserved. This information is not intended as a substitute for professional medical care. Always follow your healthcare professional's instructions.

## 2019-02-08 LAB — S PYO THROAT QL CULT: NEGATIVE

## 2019-10-03 ENCOUNTER — TELEPHONE (OUTPATIENT)
Dept: BARIATRICS | Facility: CLINIC | Age: 49
End: 2019-10-03

## 2019-10-18 ENCOUNTER — OFFICE VISIT (OUTPATIENT)
Dept: URGENT CARE | Facility: CLINIC | Age: 49
End: 2019-10-18
Payer: COMMERCIAL

## 2019-10-18 VITALS
SYSTOLIC BLOOD PRESSURE: 121 MMHG | WEIGHT: 180 LBS | TEMPERATURE: 98 F | HEIGHT: 64 IN | RESPIRATION RATE: 18 BRPM | BODY MASS INDEX: 30.73 KG/M2 | HEART RATE: 94 BPM | OXYGEN SATURATION: 99 % | DIASTOLIC BLOOD PRESSURE: 84 MMHG

## 2019-10-18 DIAGNOSIS — R09.89 CHEST CONGESTION: Primary | ICD-10-CM

## 2019-10-18 DIAGNOSIS — J45.909 ASTHMA, UNSPECIFIED ASTHMA SEVERITY, UNSPECIFIED WHETHER COMPLICATED, UNSPECIFIED WHETHER PERSISTENT: ICD-10-CM

## 2019-10-18 PROCEDURE — 3008F BODY MASS INDEX DOCD: CPT | Mod: CPTII,S$GLB,, | Performed by: NURSE PRACTITIONER

## 2019-10-18 PROCEDURE — 99213 OFFICE O/P EST LOW 20 MIN: CPT | Mod: 25,S$GLB,, | Performed by: NURSE PRACTITIONER

## 2019-10-18 PROCEDURE — 96372 THER/PROPH/DIAG INJ SC/IM: CPT | Mod: S$GLB,,, | Performed by: PREVENTIVE MEDICINE

## 2019-10-18 PROCEDURE — 3008F PR BODY MASS INDEX (BMI) DOCUMENTED: ICD-10-PCS | Mod: CPTII,S$GLB,, | Performed by: NURSE PRACTITIONER

## 2019-10-18 PROCEDURE — 99213 PR OFFICE/OUTPT VISIT, EST, LEVL III, 20-29 MIN: ICD-10-PCS | Mod: 25,S$GLB,, | Performed by: NURSE PRACTITIONER

## 2019-10-18 PROCEDURE — 96372 PR INJECTION,THERAP/PROPH/DIAG2ST, IM OR SUBCUT: ICD-10-PCS | Mod: S$GLB,,, | Performed by: PREVENTIVE MEDICINE

## 2019-10-18 RX ORDER — PREDNISONE 20 MG/1
20 TABLET ORAL DAILY
Qty: 7 TABLET | Refills: 0 | Status: SHIPPED | OUTPATIENT
Start: 2019-10-18 | End: 2019-10-25

## 2019-10-18 RX ORDER — BETAMETHASONE SODIUM PHOSPHATE AND BETAMETHASONE ACETATE 3; 3 MG/ML; MG/ML
6 INJECTION, SUSPENSION INTRA-ARTICULAR; INTRALESIONAL; INTRAMUSCULAR; SOFT TISSUE
Status: COMPLETED | OUTPATIENT
Start: 2019-10-18 | End: 2019-10-18

## 2019-10-18 RX ADMIN — BETAMETHASONE SODIUM PHOSPHATE AND BETAMETHASONE ACETATE 6 MG: 3; 3 INJECTION, SUSPENSION INTRA-ARTICULAR; INTRALESIONAL; INTRAMUSCULAR; SOFT TISSUE at 12:10

## 2019-10-18 NOTE — PATIENT INSTRUCTIONS
Asthma Trigger Checklist  Allergens, irritants, and other things may trigger your asthma. Check the box next to each of your triggers. After each trigger is a list of ways to avoid it.     Dust mites. Dust mites live in mattresses, bedding, carpets, curtains, and indoor dust.  · To kill dust mites, wash bedding in hot water (130°F) each week.  · Cover mattress and pillows with special dust-mite-proof cases.  · Dont use upholstered furniture like sofas or chairs in the bedroom.  · Use allergy-proof filters for air conditioners and furnaces. Replace or clean them as instructed.  · If you can, replace carpeting with wood or tile jyotsna, especially in the bedroom.    Animals. Animals with fur or feathers shed dander (allergens).  · Its best to choose a pet that doesnt have fur or feathers, such as a fish or a reptile.  · If you have pets, keep them off of your bed and out of your bedroom.  · Wash your hands and clothes after handling pets.      Mold. Mold grows in damp places, such as bathrooms, basements, and closets.  · Ask someone to clean damp areas in your home every week. Or try wearing a face mask while you clean.  · Run an exhaust fan while bathing. Or leave a window open in the bathroom.  · Repair water leaks in or around your home.  · Have someone else cut grass or rake leaves, if possible.  · Dont use vaporizers or humidifiers. They encourage mold growth.      Pollen. Pollen from trees, grasses, and weeds is a common allergen. (Flower pollens are generally not a problem).  · Try to learn what types of pollen affect you most. Pollen levels vary depending on the plant, the season, and the time of day.  · If possible, use air conditioning instead of opening the windows in your home or car.  · Have someone else do yard work, if possible.      Cockroaches. Roaches are found in many homes and produce allergens.  · Keep your kitchen clean and dry. A leaky faucet or drain can attract roaches.  · Remove  garbage from your home daily.  · Store food in tightly sealed containers. Wash dishes as soon as they are used.  · Use bait stations or traps to control roaches. Avoid using chemical sprays.      Smoke. Smoke may be from cigarettes, cigars, pipes, incense or candles, barbecues or grills, and fireplaces.  · Dont smoke. And dont let people smoke in your home or car.  · When you travel, ask for nonsmoking rental cars and hotel rooms.  · Avoid fireplaces and wood stoves. If you cant, sit away from them. Make sure the smoke is directed outside.  · Dont burn incense or use candles.  · Move away from smoky outdoor cooking grills.      Smog.  Smog is from car exhaust and other pollution.  · Read or listen to local air-quality reports. These let you know when air quality is poor.  · Stay indoors as much as you can on smoggy days. If possible, use air conditioning instead of opening the windows.  · In your car, set air conditioning to recirculate air, so less pollution gets in.      Strong odors. These include air fresheners, deodorizers, and cleaning products; perfume, deodorant, and other beauty products; incense and candles; and insect sprays and other sprays.  · Use scent-free products like deodorant or body lotion.  · Avoid using cleaning products with bleach and ammonia. Make your own cleaning solution with white vinegar, baking soda, or mild dish soap.  · Use exhaust fans while cooking. Or open a window, if possible.   · Avoid perfumes, air fresheners, potpourri, and other scented products.      Other irritants. These include dust, aerosol sprays, and powders.  · Wear a face mask while doing tasks like sanding, dusting, sweeping, and yard work. Open doors and windows if working indoors.  · Use pump spray bottles instead of aerosols.  · Pour liquid  onto a rag or cloth instead of spraying them.      Weather. Weather conditions can trigger symptoms or make them worse.  · Watch for very high or low  temperatures, very humid conditions, or a lot of wind, as these conditions can make symptoms worse.  · Limit outdoor activity during the type of weather that affects you.  · Wear a scarf over your mouth and nose in cold weather.      Colds, flu, and sinus infections. Upper respiratory infections can trigger asthma.  · Wash your hands often with soap and warm water or use a hand  containing alcohol.  · Get a yearly flu shot. And ask if you should get a pneumonia vaccine.  · Take care of your general health. Get plenty of sleep. And eat a variety of healthy foods.      Food additives. Food additives can trigger asthma flare-ups in some people.  · Check food labels for sulfites or other similar ingredients. These are often found in foods such as wine, beer, and dried fruits.  · Avoid foods that contain these additives.      Medicine. Aspirin, NSAIDS like ibuprofen and naproxen, and heart medicines like beta-blockers may be triggers.  · Tell your health care provider if you think certain medicines trigger symptoms.   · Be sure to read the labels on over-the-counter medicines. They may have ingredients that cause symptoms for you.     , Emotions. Laughing, crying, or feeling excited are triggers for some people.   · To help you stay calm: Try breathing in slowly through your nose for a count of 2 seconds. Close your lips and breathe out for 4 seconds. Repeat.  · Try to focus on a soothing image in your mind. This will help relax you and calm your breathing.  · Remember to take your daily controller medicines. When youre upset or under stress, its easy to forget.      Exercise. For some people, exercise can trigger symptoms. Dont let this keep you from being active.   · If you have not been exercising regularly, start slow and work up gradually.  · Take all of your medicines as prescribed.  · If you use quick-relief medicine, make sure you have it with you when you exercise.  · Stop if you have any  symptoms. Make sure you talk with your provider about these symptoms.  Date Last Reviewed: 1/1/2017  © 1875-5601 [employer]. [address] [place] [address], [place] [address]. All rights reserved. This information is not intended as a substitute for professional medical care. Always follow your healthcare professional's instructions.      Please return to clinic or go to the Emergency department for any concerns or worsening of your symptoms. If you are not improving after treatment please follow up with your primary care md for further evaluation.  If you were prescribed antibiotics please take them to completion. If you were prescribed a narcotic medication, do not drive or operate heavy machinery while taking these medications.

## 2019-10-18 NOTE — PROGRESS NOTES
"Subjective:       Patient ID: Meenakshi Lee is a 49 y.o. female.    Vitals:  height is 5' 4" (1.626 m) and weight is 81.6 kg (180 lb). Her temperature is 97.5 °F (36.4 °C). Her blood pressure is 121/84 and her pulse is 94. Her respiration is 18 and oxygen saturation is 99%.     Chief Complaint: Cough    Ambulatory with c/o tightness in chest with congestion that started about three days ago. Hx of asthma- used her neb with some relief. Denies any other symptoms, denies sob.     Cough   This is a new problem. Episode onset: 4 days. The problem has been gradually worsening. The problem occurs constantly. The cough is productive of sputum. Pertinent negatives include no chills, ear pain, eye redness, fever, hemoptysis, myalgias, rash, sore throat, shortness of breath or wheezing. Nothing aggravates the symptoms. She has tried ipratropium inhaler for the symptoms. The treatment provided no relief. Her past medical history is significant for asthma.       Constitution: Negative. Negative for chills, sweating, fatigue and fever.   HENT: Negative.  Negative for ear pain, congestion, sinus pain, sinus pressure, sore throat and voice change.    Neck: negative. Negative for painful lymph nodes.   Cardiovascular: Negative.    Eyes: Negative.  Negative for eye redness.   Respiratory: Positive for chest tightness and cough. Negative for sputum production, bloody sputum, COPD, shortness of breath, stridor, wheezing and asthma.    Gastrointestinal: Negative.  Negative for nausea and vomiting.   Genitourinary: Negative.    Musculoskeletal: Negative.  Negative for muscle ache.   Skin: Negative.  Negative for rash.   Allergic/Immunologic: Negative.  Negative for seasonal allergies and asthma.   Neurological: Negative.    Hematologic/Lymphatic: Negative.  Negative for swollen lymph nodes.       Objective:      Physical Exam      Assessment:       1. Chest congestion    2. Asthma, unspecified asthma severity, unspecified whether " complicated, unspecified whether persistent        Plan:         Chest congestion  -     predniSONE (DELTASONE) 20 MG tablet; Take 1 tablet (20 mg total) by mouth once daily. for 7 days  Dispense: 7 tablet; Refill: 0  -     betamethasone acetate-betamethasone sodium phosphate injection 6 mg    Asthma, unspecified asthma severity, unspecified whether complicated, unspecified whether persistent  -     predniSONE (DELTASONE) 20 MG tablet; Take 1 tablet (20 mg total) by mouth once daily. for 7 days  Dispense: 7 tablet; Refill: 0  -     betamethasone acetate-betamethasone sodium phosphate injection 6 mg      Patient Instructions     Asthma Trigger Checklist  Allergens, irritants, and other things may trigger your asthma. Check the box next to each of your triggers. After each trigger is a list of ways to avoid it.     Dust mites. Dust mites live in mattresses, bedding, carpets, curtains, and indoor dust.  · To kill dust mites, wash bedding in hot water (130°F) each week.  · Cover mattress and pillows with special dust-mite-proof cases.  · Dont use upholstered furniture like sofas or chairs in the bedroom.  · Use allergy-proof filters for air conditioners and furnaces. Replace or clean them as instructed.  · If you can, replace carpeting with wood or tile jyotsna, especially in the bedroom.    Animals. Animals with fur or feathers shed dander (allergens).  · Its best to choose a pet that doesnt have fur or feathers, such as a fish or a reptile.  · If you have pets, keep them off of your bed and out of your bedroom.  · Wash your hands and clothes after handling pets.      Mold. Mold grows in damp places, such as bathrooms, basements, and closets.  · Ask someone to clean damp areas in your home every week. Or try wearing a face mask while you clean.  · Run an exhaust fan while bathing. Or leave a window open in the bathroom.  · Repair water leaks in or around your home.  · Have someone else cut grass or rake leaves, if  possible.  · Dont use vaporizers or humidifiers. They encourage mold growth.      Pollen. Pollen from trees, grasses, and weeds is a common allergen. (Flower pollens are generally not a problem).  · Try to learn what types of pollen affect you most. Pollen levels vary depending on the plant, the season, and the time of day.  · If possible, use air conditioning instead of opening the windows in your home or car.  · Have someone else do yard work, if possible.      Cockroaches. Roaches are found in many homes and produce allergens.  · Keep your kitchen clean and dry. A leaky faucet or drain can attract roaches.  · Remove garbage from your home daily.  · Store food in tightly sealed containers. Wash dishes as soon as they are used.  · Use bait stations or traps to control roaches. Avoid using chemical sprays.      Smoke. Smoke may be from cigarettes, cigars, pipes, incense or candles, barbecues or grills, and fireplaces.  · Dont smoke. And dont let people smoke in your home or car.  · When you travel, ask for nonsmoking rental cars and hotel rooms.  · Avoid fireplaces and wood stoves. If you cant, sit away from them. Make sure the smoke is directed outside.  · Dont burn incense or use candles.  · Move away from smoky outdoor cooking grills.      Smog.  Smog is from car exhaust and other pollution.  · Read or listen to local air-quality reports. These let you know when air quality is poor.  · Stay indoors as much as you can on smoggy days. If possible, use air conditioning instead of opening the windows.  · In your car, set air conditioning to recirculate air, so less pollution gets in.      Strong odors. These include air fresheners, deodorizers, and cleaning products; perfume, deodorant, and other beauty products; incense and candles; and insect sprays and other sprays.  · Use scent-free products like deodorant or body lotion.  · Avoid using cleaning products with bleach and ammonia. Make your own cleaning  solution with white vinegar, baking soda, or mild dish soap.  · Use exhaust fans while cooking. Or open a window, if possible.   · Avoid perfumes, air fresheners, potpourri, and other scented products.      Other irritants. These include dust, aerosol sprays, and powders.  · Wear a face mask while doing tasks like sanding, dusting, sweeping, and yard work. Open doors and windows if working indoors.  · Use pump spray bottles instead of aerosols.  · Pour liquid  onto a rag or cloth instead of spraying them.      Weather. Weather conditions can trigger symptoms or make them worse.  · Watch for very high or low temperatures, very humid conditions, or a lot of wind, as these conditions can make symptoms worse.  · Limit outdoor activity during the type of weather that affects you.  · Wear a scarf over your mouth and nose in cold weather.      Colds, flu, and sinus infections. Upper respiratory infections can trigger asthma.  · Wash your hands often with soap and warm water or use a hand  containing alcohol.  · Get a yearly flu shot. And ask if you should get a pneumonia vaccine.  · Take care of your general health. Get plenty of sleep. And eat a variety of healthy foods.      Food additives. Food additives can trigger asthma flare-ups in some people.  · Check food labels for sulfites or other similar ingredients. These are often found in foods such as wine, beer, and dried fruits.  · Avoid foods that contain these additives.      Medicine. Aspirin, NSAIDS like ibuprofen and naproxen, and heart medicines like beta-blockers may be triggers.  · Tell your health care provider if you think certain medicines trigger symptoms.   · Be sure to read the labels on over-the-counter medicines. They may have ingredients that cause symptoms for you.     , Emotions. Laughing, crying, or feeling excited are triggers for some people.   · To help you stay calm: Try breathing in slowly through your nose for a count of 2  seconds. Close your lips and breathe out for 4 seconds. Repeat.  · Try to focus on a soothing image in your mind. This will help relax you and calm your breathing.  · Remember to take your daily controller medicines. When youre upset or under stress, its easy to forget.      Exercise. For some people, exercise can trigger symptoms. Dont let this keep you from being active.   · If you have not been exercising regularly, start slow and work up gradually.  · Take all of your medicines as prescribed.  · If you use quick-relief medicine, make sure you have it with you when you exercise.  · Stop if you have any symptoms. Make sure you talk with your provider about these symptoms.  Date Last Reviewed: 1/1/2017 © 2000-2017 [employer]. [address] [place] [address], [place] [address]. All rights reserved. This information is not intended as a substitute for professional medical care. Always follow your healthcare professional's instructions.      Please return to clinic or go to the Emergency department for any concerns or worsening of your symptoms. If you are not improving after treatment please follow up with your primary care md for further evaluation.  If you were prescribed antibiotics please take them to completion. If you were prescribed a narcotic medication, do not drive or operate heavy machinery while taking these medications.

## 2019-10-24 ENCOUNTER — OFFICE VISIT (OUTPATIENT)
Dept: URGENT CARE | Facility: CLINIC | Age: 49
End: 2019-10-24
Payer: COMMERCIAL

## 2019-10-24 VITALS
TEMPERATURE: 97 F | HEIGHT: 64 IN | DIASTOLIC BLOOD PRESSURE: 73 MMHG | WEIGHT: 180 LBS | OXYGEN SATURATION: 100 % | RESPIRATION RATE: 18 BRPM | SYSTOLIC BLOOD PRESSURE: 106 MMHG | HEART RATE: 99 BPM | BODY MASS INDEX: 30.73 KG/M2

## 2019-10-24 DIAGNOSIS — J32.9 RHINOSINUSITIS: Primary | ICD-10-CM

## 2019-10-24 DIAGNOSIS — J06.9 UPPER RESPIRATORY TRACT INFECTION, UNSPECIFIED TYPE: ICD-10-CM

## 2019-10-24 PROCEDURE — 99214 OFFICE O/P EST MOD 30 MIN: CPT | Mod: S$GLB,,, | Performed by: PHYSICIAN ASSISTANT

## 2019-10-24 PROCEDURE — 99214 PR OFFICE/OUTPT VISIT, EST, LEVL IV, 30-39 MIN: ICD-10-PCS | Mod: S$GLB,,, | Performed by: PHYSICIAN ASSISTANT

## 2019-10-24 PROCEDURE — 3008F BODY MASS INDEX DOCD: CPT | Mod: CPTII,S$GLB,, | Performed by: PHYSICIAN ASSISTANT

## 2019-10-24 PROCEDURE — 3008F PR BODY MASS INDEX (BMI) DOCUMENTED: ICD-10-PCS | Mod: CPTII,S$GLB,, | Performed by: PHYSICIAN ASSISTANT

## 2019-10-24 RX ORDER — PREDNISONE 20 MG/1
40 TABLET ORAL DAILY
Qty: 10 TABLET | Refills: 0 | Status: SHIPPED | OUTPATIENT
Start: 2019-10-24 | End: 2019-10-29

## 2019-10-24 RX ORDER — PROMETHAZINE HYDROCHLORIDE AND DEXTROMETHORPHAN HYDROBROMIDE 6.25; 15 MG/5ML; MG/5ML
5 SYRUP ORAL EVERY 6 HOURS PRN
Qty: 120 ML | Refills: 0 | Status: SHIPPED | OUTPATIENT
Start: 2019-10-24 | End: 2019-11-03

## 2019-10-24 NOTE — PATIENT INSTRUCTIONS
"- Rest.    - Drink plenty of fluids.    - Tylenol or Ibuprofen as directed as needed for fever/pain.    - Take over-the-counter claritin, zyrtec, allegra, or xyzal as directed.  You should NOT use a decongestant form (D) of this medication if you have a history of hypertension or heart disease.  - Use over the counter Flonase as directed for sinus congestion and postnasal drip.  - use nasal saline prior to Flonase.  - stop using Flonase if you developed nosebleeds.  - Use Ocean Spray Nasal Saline 1-3 puffs each nostril every 2-3 hours then blow out onto tissue. This is to irrigate the nasal passage way to clear the sinus openings. Use until sinus problem resolved.  - You can take over the counter Day-quil/Ni-quil as directed for symptom relief.  Watch for Tylenol content if you are also using Tylenol.  You should not "double up" on medications like Tylenol or decongestants as these are both found in Day-quil.  You should not take Day-quil if you have high blood pressure or heart disease as it does have a decongestant in it.   - Follow up with your PCP or specialty clinic as directed in the next 1-2 weeks if not improved or as needed.  You can call [phone] to schedule an appointment with the appropriate provider.    - Go to the ED if your symptoms worsen.  - You must understand that you have received an Urgent Care treatment only and that you may be released before all of your medical problems are known or treated.   - You, the patient, will arrange for follow up care as instructed.   - If your condition worsens or fails to improve we recommend that you receive another evaluation at the ER immediately or contact your PCP to discuss your concerns or return here.       Sinusitis (No Antibiotics)    The sinuses are air-filled spaces within the bones of the face. They connect to the inside of the nose. Sinusitis is an inflammation of the tissue lining the sinus cavity. Sinus inflammation can occur during a " cold. It can also be due to allergies to pollens and other particles in the air. It can cause symptoms such as sinus congestion, headache, sore throat, facial swelling and fullness. It may also cause a low-grade fever. No infection is present, and no antibiotic treatment is needed.  Home care  · Drink plenty of water, hot tea, and other liquids. This may help thin mucus. It also may promote sinus drainage.  · Heat may help soothe painful areas of the face. Use a towel soaked in hot water. Or,  the shower and direct the hot spray onto your face. Using a vaporizer along with a menthol rub at night may also help.   · An expectorant containing guaifenesin may help thin the mucus and promote drainage from the sinuses.  · Over-the-counter decongestants may be used unless a similar medicine was prescribed. Nasal sprays work the fastest. Use one that contains phenylephrine or oxymetazoline. First blow the nose gently. Then use the spray. Do not use these medicines more often than directed on the label or symptoms may get worse. You may also use tablets containing pseudoephedrine. Avoid products that combine ingredients, because side effects may be increased. Read labels. You can also ask the pharmacist for help. (NOTE: Persons with high blood pressure should not use decongestants. They can raise blood pressure.)  · Over-the-counter antihistamines may help if allergies contributed to your sinusitis.    · Use acetaminophen or ibuprofen to control pain, unless another pain medicine was prescribed. (If you have chronic liver or kidney disease or ever had a stomach ulcer, talk with your doctor before using these medicines. Aspirin should never be used in anyone under 18 years of age who is ill with a fever. It may cause severe liver damage.)  · Use nasal rinses or irrigation as instructed by your health care provider.  · Don't smoke. This can worsen symptoms.  Follow-up care  Follow up with your healthcare provider or  our staff if you are not improving within the next week.  When to seek medical advice  Call your healthcare provider if any of these occur:  · Green or yellow discharge from the nose or into the throat  · Facial pain or headache becoming more severe  · Stiff neck  · Unusual drowsiness or confusion  · Swelling of the forehead or eyelids  · Vision problems, including blurred or double vision  · Fever of 100.4ºF (38ºC) or higher, or as directed by your healthcare provider  · Seizure  · Breathing problems  · Symptoms not resolving within 10 days  Date Last Reviewed: 4/13/2015  © 6866-8599 [employer]. [address] [place] [address]. All rights reserved. This information is not intended as a substitute for professional medical care. Always follow your healthcare professional's instructions.        Viral Upper Respiratory Illness (Adult)  You have a viral upper respiratory illness (URI), which is another term for the common cold. This illness is contagious during the first few days. It is spread through the air by coughing and sneezing. It may also be spread by direct contact (touching the sick person and then touching your own eyes, nose, or mouth). Frequent handwashing will decrease risk of spread. Most viral illnesses go away within 7 to 10 days with rest and simple home remedies. Sometimes the illness may last for several weeks. Antibiotics will not kill a virus, and they are generally not prescribed for this condition.    Home care  · If symptoms are severe, rest at home for the first 2 to 3 days. When you resume activity, don't let yourself get too tired.  · Avoid being exposed to cigarette smoke (yours or others).  · You may use acetaminophen or ibuprofen to control pain and fever, unless another medicine was prescribed. (Note: If you have chronic liver or kidney disease, have ever had a stomach ulcer or gastrointestinal bleeding, or are taking blood-thinning medicines, talk with your  healthcare provider before using these medicines.) Aspirin should never be given to anyone under 18 years of age who is ill with a viral infection or fever. It may cause severe liver or brain damage.  · Your appetite may be poor, so a light diet is fine. Avoid dehydration by drinking 6 to 8 glasses of fluids per day (water, soft drinks, juices, tea, or soup). Extra fluids will help loosen secretions in the nose and lungs.  · Over-the-counter cold medicines will not shorten the length of time youre sick, but they may be helpful for the following symptoms: cough, sore throat, and nasal and sinus congestion. (Note: Do not use decongestants if you have high blood pressure.)  Follow-up care  Follow up with your healthcare provider, or as advised.  When to seek medical advice  Call your healthcare provider right away if any of these occur:  · Cough with lots of colored sputum (mucus)  · Severe headache; face, neck, or ear pain  · Difficulty swallowing due to throat pain  · Fever of 100.4°F (38°C)  Call 911, or get immediate medical care  Call emergency services right away if any of these occur:  · Chest pain, shortness of breath, wheezing, or difficulty breathing  · Coughing up blood  · Inability to swallow due to throat pain  Date Last Reviewed: 9/13/2015  © 5758-3311 [employer]. [address] [place] [address], [place] [address]. All rights reserved. This information is not intended as a substitute for professional medical care. Always follow your healthcare professional's instructions.

## 2019-10-24 NOTE — PROGRESS NOTES
"Subjective:       Patient ID: Meenakshi Lee is a 49 y.o. female.    Vitals:  height is 5' 4" (1.626 m) and weight is 81.6 kg (180 lb). Her temperature is 97.4 °F (36.3 °C). Her blood pressure is 106/73 and her pulse is 99. Her respiration is 18 and oxygen saturation is 100%.     Chief Complaint: Sinus Problem    Pt was seen here 10/18/2019 for asthma symptoms.  She had chest congestion, cough, and wheezing.  She was given breathing medications and prednisone.  She only took prednisone for 2 days and stop because her asthma symptoms improved.  She says 2 days later she started with sinus congestion, sore throat, runny nose, and sinus pressure.  She feels like she may have a sinus infection, coughing a lot, runny nose and congested.  Her symptoms are worse at night.  She took some leftover cough syrup with codeine she had in order to sleep at night.  She is unsure if she has had fever.    Sinus Problem   This is a new problem. The current episode started in the past 7 days. The problem is unchanged. There has been no fever. The pain is mild. Associated symptoms include congestion, coughing and sinus pressure. Pertinent negatives include no chills, headaches, shortness of breath or sore throat.       Constitution: Negative for chills, fatigue and fever.   HENT: Positive for congestion, postnasal drip and sinus pressure. Negative for sore throat.    Neck: Negative for painful lymph nodes.   Cardiovascular: Negative for chest pain and leg swelling.   Eyes: Negative for double vision and blurred vision.   Respiratory: Positive for cough. Negative for shortness of breath.    Gastrointestinal: Negative for nausea, vomiting and diarrhea.   Genitourinary: Negative for dysuria, frequency, urgency and history of kidney stones.   Musculoskeletal: Negative for joint pain, joint swelling, muscle cramps and muscle ache.   Skin: Negative for color change, pale, rash and bruising.   Allergic/Immunologic: Negative for seasonal " allergies.   Neurological: Negative for dizziness, history of vertigo, light-headedness, passing out and headaches.   Hematologic/Lymphatic: Negative for swollen lymph nodes.   Psychiatric/Behavioral: Negative for nervous/anxious, sleep disturbance and depression. The patient is not nervous/anxious.        Objective:      Physical Exam   Constitutional: She is oriented to person, place, and time. She appears well-developed and well-nourished. No distress.   HENT:   Head: Normocephalic and atraumatic.   Right Ear: Hearing, tympanic membrane, external ear and ear canal normal.   Left Ear: Hearing, tympanic membrane, external ear and ear canal normal.   Nose: Mucosal edema and rhinorrhea present. Right sinus exhibits no maxillary sinus tenderness and no frontal sinus tenderness. Left sinus exhibits no maxillary sinus tenderness and no frontal sinus tenderness.   Mouth/Throat: Uvula is midline and oropharynx is clear and moist.   Eyes: Conjunctivae are normal.   Cardiovascular: Normal rate, regular rhythm and normal heart sounds. Exam reveals no gallop and no friction rub.   No murmur heard.  Pulmonary/Chest: Effort normal and breath sounds normal. No respiratory distress. She has no wheezes.   Musculoskeletal: Normal range of motion. She exhibits no tenderness.   Neurological: She is alert and oriented to person, place, and time.   Skin: Skin is warm, dry and not diaphoretic.   Psychiatric: She has a normal mood and affect. Her behavior is normal. Judgment and thought content normal.         Assessment:       1. Rhinosinusitis    2. Upper respiratory tract infection, unspecified type        Plan:         Rhinosinusitis  -     predniSONE (DELTASONE) 20 MG tablet; Take 2 tablets (40 mg total) by mouth once daily. for 5 days  Dispense: 10 tablet; Refill: 0    Upper respiratory tract infection, unspecified type  -     predniSONE (DELTASONE) 20 MG tablet; Take 2 tablets (40 mg total) by mouth once daily. for 5 days   "Dispense: 10 tablet; Refill: 0  -     promethazine-dextromethorphan (PROMETHAZINE-DM) 6.25-15 mg/5 mL Syrp; Take 5 mLs by mouth every 6 (six) hours as needed (cough).  Dispense: 120 mL; Refill: 0         Meenakshi was seen today for sinus problem.    Diagnoses and all orders for this visit:    Rhinosinusitis  -     predniSONE (DELTASONE) 20 MG tablet; Take 2 tablets (40 mg total) by mouth once daily. for 5 days    Upper respiratory tract infection, unspecified type  -     predniSONE (DELTASONE) 20 MG tablet; Take 2 tablets (40 mg total) by mouth once daily. for 5 days  -     promethazine-dextromethorphan (PROMETHAZINE-DM) 6.25-15 mg/5 mL Syrp; Take 5 mLs by mouth every 6 (six) hours as needed (cough).      Patient Instructions   - Rest.    - Drink plenty of fluids.    - Tylenol or Ibuprofen as directed as needed for fever/pain.    - Take over-the-counter claritin, zyrtec, allegra, or xyzal as directed.  You should NOT use a decongestant form (D) of this medication if you have a history of hypertension or heart disease.  - Use over the counter Flonase as directed for sinus congestion and postnasal drip.  - use nasal saline prior to Flonase.  - stop using Flonase if you developed nosebleeds.  - Use Ocean Spray Nasal Saline 1-3 puffs each nostril every 2-3 hours then blow out onto tissue. This is to irrigate the nasal passage way to clear the sinus openings. Use until sinus problem resolved.  - You can take over the counter Day-quil/Ni-quil as directed for symptom relief.  Watch for Tylenol content if you are also using Tylenol.  You should not "double up" on medications like Tylenol or decongestants as these are both found in Day-quil.  You should not take Day-quil if you have high blood pressure or heart disease as it does have a decongestant in it.   - Follow up with your PCP or specialty clinic as directed in the next 1-2 weeks if not improved or as needed.  You can call [phone] to schedule an appointment with " the appropriate provider.    - Go to the ED if your symptoms worsen.  - You must understand that you have received an Urgent Care treatment only and that you may be released before all of your medical problems are known or treated.   - You, the patient, will arrange for follow up care as instructed.   - If your condition worsens or fails to improve we recommend that you receive another evaluation at the ER immediately or contact your PCP to discuss your concerns or return here.       Sinusitis (No Antibiotics)    The sinuses are air-filled spaces within the bones of the face. They connect to the inside of the nose. Sinusitis is an inflammation of the tissue lining the sinus cavity. Sinus inflammation can occur during a cold. It can also be due to allergies to pollens and other particles in the air. It can cause symptoms such as sinus congestion, headache, sore throat, facial swelling and fullness. It may also cause a low-grade fever. No infection is present, and no antibiotic treatment is needed.  Home care  · Drink plenty of water, hot tea, and other liquids. This may help thin mucus. It also may promote sinus drainage.  · Heat may help soothe painful areas of the face. Use a towel soaked in hot water. Or,  the shower and direct the hot spray onto your face. Using a vaporizer along with a menthol rub at night may also help.   · An expectorant containing guaifenesin may help thin the mucus and promote drainage from the sinuses.  · Over-the-counter decongestants may be used unless a similar medicine was prescribed. Nasal sprays work the fastest. Use one that contains phenylephrine or oxymetazoline. First blow the nose gently. Then use the spray. Do not use these medicines more often than directed on the label or symptoms may get worse. You may also use tablets containing pseudoephedrine. Avoid products that combine ingredients, because side effects may be increased. Read labels. You can also ask the  pharmacist for help. (NOTE: Persons with high blood pressure should not use decongestants. They can raise blood pressure.)  · Over-the-counter antihistamines may help if allergies contributed to your sinusitis.    · Use acetaminophen or ibuprofen to control pain, unless another pain medicine was prescribed. (If you have chronic liver or kidney disease or ever had a stomach ulcer, talk with your doctor before using these medicines. Aspirin should never be used in anyone under 18 years of age who is ill with a fever. It may cause severe liver damage.)  · Use nasal rinses or irrigation as instructed by your health care provider.  · Don't smoke. This can worsen symptoms.  Follow-up care  Follow up with your healthcare provider or our staff if you are not improving within the next week.  When to seek medical advice  Call your healthcare provider if any of these occur:  · Green or yellow discharge from the nose or into the throat  · Facial pain or headache becoming more severe  · Stiff neck  · Unusual drowsiness or confusion  · Swelling of the forehead or eyelids  · Vision problems, including blurred or double vision  · Fever of 100.4ºF (38ºC) or higher, or as directed by your healthcare provider  · Seizure  · Breathing problems  · Symptoms not resolving within 10 days  Date Last Reviewed: 4/13/2015  © 2926-3855 [employer]. [address] [place] [address], [place] [address]. All rights reserved. This information is not intended as a substitute for professional medical care. Always follow your healthcare professional's instructions.        Viral Upper Respiratory Illness (Adult)  You have a viral upper respiratory illness (URI), which is another term for the common cold. This illness is contagious during the first few days. It is spread through the air by coughing and sneezing. It may also be spread by direct contact (touching the sick person and then touching your own eyes, nose, or mouth). Frequent handwashing will  decrease risk of spread. Most viral illnesses go away within 7 to 10 days with rest and simple home remedies. Sometimes the illness may last for several weeks. Antibiotics will not kill a virus, and they are generally not prescribed for this condition.    Home care  · If symptoms are severe, rest at home for the first 2 to 3 days. When you resume activity, don't let yourself get too tired.  · Avoid being exposed to cigarette smoke (yours or others).  · You may use acetaminophen or ibuprofen to control pain and fever, unless another medicine was prescribed. (Note: If you have chronic liver or kidney disease, have ever had a stomach ulcer or gastrointestinal bleeding, or are taking blood-thinning medicines, talk with your healthcare provider before using these medicines.) Aspirin should never be given to anyone under 18 years of age who is ill with a viral infection or fever. It may cause severe liver or brain damage.  · Your appetite may be poor, so a light diet is fine. Avoid dehydration by drinking 6 to 8 glasses of fluids per day (water, soft drinks, juices, tea, or soup). Extra fluids will help loosen secretions in the nose and lungs.  · Over-the-counter cold medicines will not shorten the length of time youre sick, but they may be helpful for the following symptoms: cough, sore throat, and nasal and sinus congestion. (Note: Do not use decongestants if you have high blood pressure.)  Follow-up care  Follow up with your healthcare provider, or as advised.  When to seek medical advice  Call your healthcare provider right away if any of these occur:  · Cough with lots of colored sputum (mucus)  · Severe headache; face, neck, or ear pain  · Difficulty swallowing due to throat pain  · Fever of 100.4°F (38°C)  Call 911, or get immediate medical care  Call emergency services right away if any of these occur:  · Chest pain, shortness of breath, wheezing, or difficulty breathing  · Coughing up blood  · Inability to  swallow due to throat pain  Date Last Reviewed: 9/13/2015  © 7572-7722 [employer], [employer]. [address] [place] [address], [place] [address]. All rights reserved. This information is not intended as a substitute for professional medical care. Always follow your healthcare professional's instructions.

## 2019-10-28 ENCOUNTER — TELEPHONE (OUTPATIENT)
Dept: BARIATRICS | Facility: CLINIC | Age: 49
End: 2019-10-28

## 2019-10-28 NOTE — TELEPHONE ENCOUNTER
----- Message from Cecelia Mendes MD sent at 10/28/2019  2:20 PM CDT -----  Pt scheduled for tomorrow. Per bailio note 10/3/19, pt does not have bariatric services in her plan.     Thank you,  Dr. Mendes

## 2019-10-28 NOTE — TELEPHONE ENCOUNTER
Attempted to contact. Ms. Lee. In regards to her insurance does not cover for bariatric's. No answer. Left message. Requested a call back for better clarification.

## 2019-10-28 NOTE — TELEPHONE ENCOUNTER
Called pt back to inform her that her insurance does not cover bariatrics. Pt understands and instructed me to cancel.

## 2020-05-09 ENCOUNTER — RESEARCH ENCOUNTER (OUTPATIENT)
Dept: RESEARCH | Facility: HOSPITAL | Age: 50
End: 2020-05-09

## 2020-05-09 ENCOUNTER — LAB VISIT (OUTPATIENT)
Dept: PRIMARY CARE CLINIC | Facility: CLINIC | Age: 50
End: 2020-05-09
Payer: COMMERCIAL

## 2020-05-09 DIAGNOSIS — Z00.6 RESEARCH STUDY PATIENT: ICD-10-CM

## 2020-05-09 DIAGNOSIS — Z00.6 RESEARCH STUDY PATIENT: Primary | ICD-10-CM

## 2020-05-09 LAB — SARS-COV-2 IGG SERPLBLD QL IA.RAPID: NEGATIVE

## 2020-05-09 PROCEDURE — 86769 SARS-COV-2 COVID-19 ANTIBODY: CPT

## 2020-05-09 PROCEDURE — U0002 COVID-19 LAB TEST NON-CDC: HCPCS

## 2020-05-09 NOTE — PROGRESS NOTES
Date of Consent: may 09, 2020    Sponsor: [facility]    Study Title/IRB Number: Observational study of Sars-CoV2 Immunoglobulin G (IgG) seroprevalence among the [facility] population over time 2020.163  Principle Investigator: Priyanka Norris, PhD    Did the patient need translation services? No   name: N/A    Prior to the Informed Consent (IC) being signed, or any study protocol required data collection, testing, procedure, or intervention being performed, the following was done and/or discussed:   Patient was given a paper copy of the IC for review    Patient was given study FAQ   Purpose of the study and qualifications to participate    Study design and tests or procedures done at this visit   Confidentiality and HIPAA Authorization for Release of Medical Records for the research trial/ subject's rights/research related injury   Risk, Benefits, Alternative Treatments, Compensation and Costs   Participation in the research trial is voluntary and patient may withdraw at anytime   Contact information for study related questions    Patient verbalizes understanding of the above: Yes  Contact information for PI and IRB given to patient: Yes  Patient able to adequately summarize: the purpose of the study, the risks associated with the study, and all procedures, testing, and follow-ups associated with the study: Yes    The consent was discussed verbally with the patient and all questions were answered satisfactorily. Patient gave verbal consent for the Seroprevalence research study with an IRB approval date of 05/08/2020.      The Consent, Consent Witness and name of Clinical Research Coordinator consenting was captured and documented in [employer].    All Inclusion and Exclusion Criteria reviewed, subject meets all Inclusion criteria and does not meet any Exclusion Criteria at this time.     Patient Eligibility was confirmed.    Patient responded to survey questions.    The following biospecimen  Does the patient already have HH?  When I pull up the orders if is suggesting she does.  If so, OK to verbal order to their HH to eval for a sitter.     Thank you,  Martin   collection procedures were collected:    -Nasopharyngeal Swab Collection  -Blood collection

## 2020-05-11 LAB — SARS-COV-2 RNA RESP QL NAA+PROBE: NOT DETECTED

## 2021-01-28 ENCOUNTER — HOSPITAL ENCOUNTER (OUTPATIENT)
Dept: CARDIOLOGY | Facility: HOSPITAL | Age: 51
Discharge: HOME | End: 2021-01-28
Attending: FAMILY MEDICINE
Payer: COMMERCIAL

## 2021-01-28 DIAGNOSIS — R07.89 ATYPICAL CHEST PAIN: ICD-10-CM

## 2021-01-28 DIAGNOSIS — R07.89 ATYPICAL CHEST PAIN: Primary | ICD-10-CM

## 2021-01-28 PROCEDURE — 93010 ELECTROCARDIOGRAM REPORT: CPT | Mod: ,,, | Performed by: INTERNAL MEDICINE

## 2021-01-28 PROCEDURE — 93005 ELECTROCARDIOGRAM TRACING: CPT

## 2021-01-28 PROCEDURE — 93010 EKG 12-LEAD: ICD-10-PCS | Mod: ,,, | Performed by: INTERNAL MEDICINE

## 2021-03-23 ENCOUNTER — TELEPHONE (OUTPATIENT)
Dept: BARIATRICS | Facility: CLINIC | Age: 51
End: 2021-03-23

## 2021-11-08 ENCOUNTER — CLINICAL SUPPORT (OUTPATIENT)
Dept: OTHER | Facility: CLINIC | Age: 51
End: 2021-11-08
Payer: COMMERCIAL

## 2021-11-08 DIAGNOSIS — Z00.8 ENCOUNTER FOR OTHER GENERAL EXAMINATION: ICD-10-CM

## 2021-11-09 VITALS — HEIGHT: 64 IN | BODY MASS INDEX: 30.9 KG/M2

## 2021-11-09 LAB
GLUCOSE SERPL-MCNC: 103 MG/DL (ref 60–140)
HDLC SERPL-MCNC: 54 MG/DL
POC CHOLESTEROL, LDL (DOCK): 125 MG/DL
POC CHOLESTEROL, TOTAL: 200 MG/DL
TRIGL SERPL-MCNC: 105 MG/DL

## 2022-11-07 ENCOUNTER — CLINICAL SUPPORT (OUTPATIENT)
Dept: OTHER | Facility: CLINIC | Age: 52
End: 2022-11-07

## 2022-11-07 DIAGNOSIS — Z00.8 ENCOUNTER FOR OTHER GENERAL EXAMINATION: ICD-10-CM

## 2022-11-09 VITALS
SYSTOLIC BLOOD PRESSURE: 118 MMHG | DIASTOLIC BLOOD PRESSURE: 84 MMHG | WEIGHT: 186 LBS | HEIGHT: 64 IN | BODY MASS INDEX: 31.76 KG/M2

## 2022-11-09 LAB
GLUCOSE SERPL-MCNC: 97 MG/DL (ref 60–140)
HDLC SERPL-MCNC: 48 MG/DL
POC CHOLESTEROL, LDL (DOCK): 120 MG/DL
POC CHOLESTEROL, TOTAL: 184 MG/DL
TRIGL SERPL-MCNC: 87 MG/DL

## 2024-09-25 ENCOUNTER — TELEPHONE (OUTPATIENT)
Dept: GASTROENTEROLOGY | Facility: CLINIC | Age: 54
End: 2024-09-25
Payer: COMMERCIAL

## 2024-09-25 DIAGNOSIS — Z87.19 HISTORY OF HEMORRHOIDS: ICD-10-CM

## 2024-09-25 DIAGNOSIS — K62.5 RECTAL BLEEDING: Primary | ICD-10-CM

## 2024-09-25 NOTE — TELEPHONE ENCOUNTER
----- Message from Elysia Hoyos Patient Care Assistant sent at 9/25/2024  1:46 PM CDT -----  Type:  Patient Returning Call    Who Called:  jewell  Who Left Message for Patient:  Vanessa Berrios  Does the patient know what this is regarding?:  referral  Best Call Back Number:  [phone]    Additional Information:  patient is returning call from above provider , please call to further  assist , thank you.

## 2024-09-25 NOTE — TELEPHONE ENCOUNTER
Informed pt information below. Verbalized understanding. Patient states she did not have a question for GI. She did state that colorectal had reached out to her to schedule an appointment for her but she was driving and was told she will call her back. No further action required at this time.

## 2024-09-25 NOTE — TELEPHONE ENCOUNTER
Patient called via telephone to discuss clinic visit reports coming for rectal bleeding r/t hemorrhoids and anal fissures, states had a colonoscopy this year in 2024 with Dr. Baig that showed hemorrhoids and anal fissures states still having rectal bleeding and needs further assistance with hemorrhoids and fissures states her GI provider Dr. Baig recommended daily oral fiber but not helping, educated patient if having continuous rectal bleeding and feeling dizzy, light headed, blurry vision, low blood pressure to report to emergency room patient verbalized understanding, recommended to see colorectal surgery as they can provide interventions for hemorrhoids and anal fissures patient verbalized understanding and would like to see a colorectal surgeon close to her home. Referral will be placed.